# Patient Record
Sex: MALE | Race: WHITE
[De-identification: names, ages, dates, MRNs, and addresses within clinical notes are randomized per-mention and may not be internally consistent; named-entity substitution may affect disease eponyms.]

---

## 2016-11-28 NOTE — HP
DATE OF ADMISSION:   



CHIEF COMPLAINT: Gastroesophageal reflux disease. 



HISTORY OF PRESENT ILLNESS: Patient is a 71-year-old male with a 

history of chronic reflux. He underwent an upper endoscopy in 2013. 

The patient was found to have erosive distal esophagitis and a hiatal 

hernia and gastritis as well. The patient has been on antacid therapy 

since that time.  



Past medical history and past surgical history:  See list. 



ALLERGIES: See list. 



PHYSICAL EXAMINATION: Deferred until the time of procedure. 



IMPRESSION: A 71-year-old male with reflux.



PLAN: Will proceed with upper endoscopy on 11/30. The risks of 

bleeding, infection and bowel perforation will be discussed with the 

patient preoperatively.

## 2017-04-19 ENCOUNTER — HOSPITAL ENCOUNTER (OUTPATIENT)
Dept: HOSPITAL 47 - ORWHC2ENDO | Age: 72
Discharge: HOME | End: 2017-04-19
Payer: MEDICARE

## 2017-04-19 VITALS — RESPIRATION RATE: 20 BRPM | TEMPERATURE: 96.9 F

## 2017-04-19 VITALS — SYSTOLIC BLOOD PRESSURE: 119 MMHG | HEART RATE: 73 BPM | DIASTOLIC BLOOD PRESSURE: 73 MMHG

## 2017-04-19 VITALS — BODY MASS INDEX: 28.8 KG/M2

## 2017-04-19 DIAGNOSIS — Z87.891: ICD-10-CM

## 2017-04-19 DIAGNOSIS — I25.2: ICD-10-CM

## 2017-04-19 DIAGNOSIS — K29.50: Primary | ICD-10-CM

## 2017-04-19 DIAGNOSIS — K21.9: ICD-10-CM

## 2017-04-19 DIAGNOSIS — Z79.899: ICD-10-CM

## 2017-04-19 DIAGNOSIS — K44.9: ICD-10-CM

## 2017-04-19 DIAGNOSIS — I10: ICD-10-CM

## 2017-04-19 DIAGNOSIS — Z95.5: ICD-10-CM

## 2017-04-19 DIAGNOSIS — E78.5: ICD-10-CM

## 2017-04-19 DIAGNOSIS — M19.90: ICD-10-CM

## 2017-04-19 DIAGNOSIS — I25.10: ICD-10-CM

## 2017-04-19 DIAGNOSIS — Z79.82: ICD-10-CM

## 2017-04-19 PROCEDURE — 88342 IMHCHEM/IMCYTCHM 1ST ANTB: CPT

## 2017-04-19 PROCEDURE — 88305 TISSUE EXAM BY PATHOLOGIST: CPT

## 2017-04-19 PROCEDURE — 43239 EGD BIOPSY SINGLE/MULTIPLE: CPT

## 2017-04-19 NOTE — P.GSHP
History of Present Illness


H&P Date: 04/19/17


Chief Complaint: Third





Patient today for upper endoscopy.  He has a chronic history of reflux.  2013 

had an upper endoscopy which revealed distal esophagitis and a hiatal hernia.  

Patient is taking daily omeprazole.  His symptoms are well-controlled on his 

oral medications.  Denies dysphagia.





Past Medical History


Past Medical History: GERD/Reflux, Hyperlipidemia, Hypertension, Myocardial 

Infarction (MI), Osteoarthritis (OA)


Additional Past Medical History / Comment(s): Hiatal Hernia, blood clot in rt 

arm yrs ago, hiatal hernia,


Last Myocardial Infarction Date:: 2005


History of Any Multi-Drug Resistant Organisms: None Reported


Past Surgical History: Heart Catheterization With Stent, Orthopedic Surgery


Additional Past Surgical History / Comment(s): orchiectomy, colonoscopy; EGD, 

lilly hand carpal tunnel, lilly shoulders surgery


Past Anesthesia/Blood Transfusion Reactions: Previous Problems w/ Anesthesia


Additional Past Anesthesia/Blood Transfusion Reaction / Comment(s): .


Date of Last Stent Placement:: 2005


Past Psychological History: No Psychological Hx Reported


Smoking Status: Former smoker


Past Alcohol Use History: Occasional


Additional Past Alcohol Use History / Comment(s): quit smoking 30 yrs ago, 

smoked for 20 yrs


Past Drug Use History: None Reported





- Past Family History


  ** Sister(s)


Family Medical History: Cancer





  ** Daughter(s)


Family Medical History: Cancer





Medications and Allergies


 Home Medications











 Medication  Instructions  Recorded  Confirmed  Type


 


Aspirin 325 mg PO DAILY 11/28/16 04/19/17 History


 


Atenolol 25 mg PO BID 11/28/16 04/19/17 History


 


Atorvastatin [Lipitor] 80 mg PO HS 11/28/16 04/19/17 History


 


Cholecalciferol [Vitamin D3] 1,000 unit PO DAILY 11/28/16 04/19/17 History


 


Enalapril [Vasotec] 2.5 mg PO HS 11/28/16 04/19/17 History


 


Ezetimibe [Zetia] 10 mg PO HS 11/28/16 04/19/17 History


 


Omeprazole 20 mg PO DAILY 11/28/16 04/19/17 History


 


Oxybutynin Chloride [Ditropan XL] 20 mg PO HS 11/28/16 04/19/17 History











 Allergies











Allergy/AdvReac Type Severity Reaction Status Date / Time


 


No Known Allergies Allergy   Verified 04/12/17 09:29














Surgical - Exam


 Vital Signs











Temp Pulse Resp BP Pulse Ox


 


 96.9 F L  84   20   128/74   94 L


 


 04/19/17 08:13  04/19/17 08:13  04/19/17 08:13  04/19/17 08:13  04/19/17 08:13

















Physical exam:


General: Well-developed, well-nourished


HEENT: Normocephalic, sclerae nonicteric


Abdomen: Nontender, nondistended


Extremities: No edema


Neuro: Alert and oriented





Assessment and Plan


(1) GERD (gastroesophageal reflux disease)


Narrative/Plan: 


Will proceed with upper endoscopy at this time.


Status: Acute

## 2017-04-19 NOTE — P.PCN
Date of Procedure: 04/19/17


Procedure(s) Performed: 


Preoperative Dx: GERD


Postoperative Dx: Gastritis with small erosions, moderate hiatal hernia


Procedure: EGD with Bx


Anesthesia: Sedation


Endoscopist: Dr. Montes


Specimens: Antrum


Endoscopic Procedure:   The patient was on the endoscopy table in the left 

decubitus position.  The Olympus gastroscope was inserted into the oropharynx 

and passed under direct visualization to the region of the third portion of the 

duodenum.  From that point the scope was slowly withdrawn inspecting all 

surfaces carefully.  There were no neoplastic inflammatory or polypoid lesions 

throughout the duodenum.  The pylorus was widely patent.  The stomach was 

carefully inspected.  There was gastritis present with a few small prepyloric 

erosions noted.  A biopsy of the antrum took place to rule out H. pylori.  

Retroflexion revealed a moderate hiatal hernia.  The patient's previous 

esophagitis however is resolved and no definite inflammation was seen.  The 

patient was then taken to the recovery room in stable condition per anesthesia 

guidelines.


Recommendations: Wait biopsy results.  Continue antiacid therapy.

## 2017-07-17 ENCOUNTER — HOSPITAL ENCOUNTER (OUTPATIENT)
Dept: HOSPITAL 47 - RADUSWWP | Age: 72
Discharge: HOME | End: 2017-07-17
Payer: MEDICARE

## 2017-07-17 DIAGNOSIS — I65.23: Primary | ICD-10-CM

## 2017-07-17 PROCEDURE — 93880 EXTRACRANIAL BILAT STUDY: CPT

## 2017-07-17 NOTE — US
EXAMINATION TYPE: US carotid duplex BILAT

 

DATE OF EXAM: 7/17/2017

 

COMPARISON: 2011 US

 

CLINICAL HISTORY: I65.23 Occlusion and stenosis of bilateral carotid. Vertigo

 

EXAM MEASUREMENTS: 

 

RIGHT:  Peak Systolic Velocity (PSV) cm/sec

----- Right CCA:  78.9  

----- Right ICA:  110     

----- Right ECA:  118   

ICA/CCA ratio:  1.4    

 

RIGHT:  End Diastole cm/sec

----- Right CCA:  20.5   

----- Right ICA:  28.6      

----- Right ECA:  19.3     

 

LEFT:  Peak Systolic Velocity (PSV) cm/sec

----- Left CCA:  86.7  

----- Left ICA:  113   

----- Left ECA:  191  

ICA/CCA ratio:  1.3  

 

LEFT:  End Diastole cm/sec

----- Left CCA:  23.6  

----- Left ICA:  34.6   

----- Left ECA:  18.6 

 

VERTEBRALS (direction of flow):

Right Vertebral: diminished

Left Vertebral: antegrade

 

1.  Mild AS changes.

2.  Bilateral ICA tortuosity.

3.  No elevated velocities.

4.  No significant stenosis.

5.  Diminished rt vertebral.

 

IMPRESSION:  

1. I DO NOT SEE EVIDENCE OF A HEMODYNAMICALLY SIGNIFICANT STENOSIS IN EITHER INTERNAL CAROTID OR COMM
ON CAROTID ARTERY.

2. ELEVATED FLOW VELOCITY, LEFT ECA.

3. DAMPENED FLOW IN THE RIGHT VERTEBRAL ARTERY MAY REFLECT DOWNSTREAM NARROWING.   

 

 

Criteria for Assigning % of Stenosis / Diameter reduction

(Estimation based on the indirect measurements of the internal carotid artery velocities (ICA PSV).

1.  Normal (no stenosis)=ICA PSV < 125 cm/s: ratio < 2.0: ICA EDV<40 cm/s.

2. Less than 50% stenosis=ICA PSV < 125 cm/s: ratio < 2.0: ICA EDV<40 cm/s.

3.  50 to 69% stenosis=ICA PSV of 125 to 230 cm/s: ration 2.0 ? 4.0: ICA EDV  cm/s.

4.  Greater than 70% stenosis to near occlusion= ICA PSV > 230 cm/s: ratio > 4.0: ICA EDV > 100 cm/s.
 

5.  Near occlusion= ICA PSV velocities may be low or undetectable: variable ratio and ICA EDV.

6.  Total occlusion=unable to detect flow.

## 2017-12-21 ENCOUNTER — HOSPITAL ENCOUNTER (EMERGENCY)
Dept: HOSPITAL 47 - EC | Age: 72
Discharge: HOME | End: 2017-12-21
Payer: MEDICARE

## 2017-12-21 VITALS — TEMPERATURE: 97.6 F | DIASTOLIC BLOOD PRESSURE: 77 MMHG | HEART RATE: 84 BPM | SYSTOLIC BLOOD PRESSURE: 113 MMHG

## 2017-12-21 VITALS — RESPIRATION RATE: 18 BRPM

## 2017-12-21 DIAGNOSIS — E78.5: ICD-10-CM

## 2017-12-21 DIAGNOSIS — Z79.899: ICD-10-CM

## 2017-12-21 DIAGNOSIS — K21.9: ICD-10-CM

## 2017-12-21 DIAGNOSIS — S05.02XA: Primary | ICD-10-CM

## 2017-12-21 DIAGNOSIS — I10: ICD-10-CM

## 2017-12-21 DIAGNOSIS — Z79.82: ICD-10-CM

## 2017-12-21 DIAGNOSIS — I25.2: ICD-10-CM

## 2017-12-21 DIAGNOSIS — Z87.891: ICD-10-CM

## 2017-12-21 DIAGNOSIS — Z95.5: ICD-10-CM

## 2017-12-21 PROCEDURE — 99282 EMERGENCY DEPT VISIT SF MDM: CPT

## 2017-12-21 NOTE — ED
Eye Problem HPI





- General


Chief complaint: Eye Problems


Stated complaint: Eye Problem


Time Seen by Provider: 12/21/17 20:41


Source: patient


Mode of arrival: ambulatory


Limitations: no limitations





- History of Present Illness


Initial comments: 


72-year-old male patient presents to the emergency department today for 

evaluation of left eye irritation and redness.  Patient states that he has been 

having issues with the eye for the last couple of months.  He states that 

however over the last couple of days the issue seem to be getting worse.  He 

states it feels like he has something in the eye.  He denies any known incident 

where something became stuck in the eye.  He states he has tried to flush out, 

has been using Visine on a daily basis for the last couple of months.  He 

states he has clear drainage from the eye all throughout the day.  He states 

when he wakes up there is some mild crusting.  He denies any cough, congestion, 

sore throat, nasal drainage, fever, or chills.  He denies any difficulty with 

vision. Patient denies any recent rash, shortness breath, chest pain, abdominal 

pain, nausea, vomiting, diarrhea, constipation, back pain, numbness, tingling, 

dizziness, weakness, hematuria, dysuria, urinary urgency, urinary frequency, 

headache, or any other complaints.








- Related Data


 Home Medications











 Medication  Instructions  Recorded  Confirmed


 


Aspirin 325 mg PO DAILY 11/28/16 04/19/17


 


Atenolol 25 mg PO BID 11/28/16 04/19/17


 


Atorvastatin [Lipitor] 80 mg PO HS 11/28/16 04/19/17


 


Cholecalciferol [Vitamin D3] 1,000 unit PO DAILY 11/28/16 04/19/17


 


Enalapril [Vasotec] 2.5 mg PO HS 11/28/16 04/19/17


 


Ezetimibe [Zetia] 10 mg PO HS 11/28/16 04/19/17


 


Omeprazole 20 mg PO DAILY 11/28/16 04/19/17


 


Oxybutynin Chloride [Ditropan XL] 20 mg PO HS 11/28/16 04/19/17











 Allergies











Allergy/AdvReac Type Severity Reaction Status Date / Time


 


No Known Allergies Allergy   Verified 12/21/17 20:08














Review of Systems


ROS Statement: 


Those systems with pertinent positive or pertinent negative responses have been 

documented in the HPI.





ROS Other: All systems not noted in ROS Statement are negative.





Past Medical History


Past Medical History: GERD/Reflux, Hyperlipidemia, Hypertension, Myocardial 

Infarction (MI), Osteoarthritis (OA)


Additional Past Medical History / Comment(s): Hiatal Hernia, blood clot in rt 

arm yrs ago, hiatal hernia, vertigo


Last Myocardial Infarction Date:: 2005


History of Any Multi-Drug Resistant Organisms: None Reported


Past Surgical History: Heart Catheterization With Stent, Orthopedic Surgery


Additional Past Surgical History / Comment(s): orchiectomy, colonoscopy; EGD, 

lilly hand carpal tunnel, lilly shoulders surgery


Past Anesthesia/Blood Transfusion Reactions: Previous Problems w/ Anesthesia


Additional Past Anesthesia/Blood Transfusion Reaction / Comment(s): .


Date of Last Stent Placement:: 2005


Past Psychological History: No Psychological Hx Reported


Smoking Status: Former smoker


Past Alcohol Use History: Occasional


Past Drug Use History: None Reported





- Past Family History


  ** Sister(s)


Family Medical History: Cancer





  ** Daughter(s)


Family Medical History: Cancer





General Exam


Limitations: no limitations


General appearance: alert, in no apparent distress, other (Physical well-

developed, well-nourished adult male patient in no acute distress.  Vital signs 

upon presentation are temperature 97.7F, pulse 99, respirations 18, blood 

pressure 142/71, pulse ox 93% on room air.)


Eye exam: Present: normal appearance, PERRL, EOMI, conjunctival injection (Mild 

left conjunctival injection), other (Clear drainage noted from the left eye.  

Floor seen stain with Wood's lamp examination was performed, there was evidence 

of a conjunctival abrasion at 3:00.  No evidence of hyphema, or corneal 

abrasion noted.).  Absent: scleral icterus, periorbital swelling


ENT exam: Present: normal exam, normal oropharynx, mucous membranes moist


Respiratory exam: Present: normal lung sounds bilaterally.  Absent: respiratory 

distress, wheezes, rales, rhonchi, stridor


Cardiovascular Exam: Present: regular rate, normal rhythm, normal heart sounds.

  Absent: systolic murmur, diastolic murmur, rubs, gallop, clicks


Neurological exam: Present: alert, oriented X3, CN II-XII intact


Psychiatric exam: Present: normal affect, normal mood


Skin exam: Present: warm, dry, intact, normal color.  Absent: rash





Course


 Vital Signs











  12/21/17 12/21/17





  20:05 21:39


 


Temperature 97.7 F 97.6 F


 


Pulse Rate 99 84


 


Respiratory 18 18





Rate  


 


Blood Pressure 142/71 113/77


 


O2 Sat by Pulse 93 L 96





Oximetry  














Medical Decision Making





- Medical Decision Making


72-year-old male patient presented to the emergency department today for 

evaluation of left eye irritation discomfort.  Physical examination did reveal 

mild conjunctival injection, with clear drainage.  Woods lamp examination with 

for seen stain was performed and did reveal a conjunctival abrasion at 3:00.  

Patient will be discharged home with tobramycin ophthalmic ointment.  He is 

instructed to take ibuprofen for discomfort.  He is instructed to use a an 

eyedrop with no preservatives like a and ice saline.  He is instructed not to 

use Visine.  He is instructed to follow-up with ophthalmology for recheck in 1-

2 days.  He is instructed to return here immediately for any new, worsening, or 

concerning symptoms.  He verbalizes understanding and agrees with this plan.








Disposition


Clinical Impression: 


 Conjunctival abrasion





Disposition: HOME SELF-CARE


Condition: Good


Instructions:  Corneal Abrasion  (ED)


Additional Instructions: 


You have a conjunctival abrasion.  Instill tobramycin ointment to the lower 

eyelid 4 times daily while awake.  Do not use Visine.  Purchase an over-the-

counter eyedrop that is preservative free, or something like eye saline drops.  

Follow-up with ophthalmology for reevaluation 1-2 days.  Return here 

immediately for any new, worsening, or concerning symptoms.


Referrals: 


Mavis Mustafa MD [Primary Care Provider] - 1-2 days


Dilip Araya MD [STAFF PHYSICIAN] - 1-2 days


Time of Disposition: 21:31

## 2018-01-19 ENCOUNTER — HOSPITAL ENCOUNTER (OUTPATIENT)
Dept: HOSPITAL 47 - RADUSWWP | Age: 73
Discharge: HOME | End: 2018-01-19
Payer: MEDICARE

## 2018-01-19 DIAGNOSIS — R31.21: Primary | ICD-10-CM

## 2018-01-19 PROCEDURE — 76770 US EXAM ABDO BACK WALL COMP: CPT

## 2018-01-19 NOTE — US
EXAMINATION TYPE: US kidneys/renal and bladder

 

DATE OF EXAM: 1/19/2018

 

COMPARISON: NONE

 

CLINICAL HISTORY: Asymptomatic Microscopic Hematuria R31.21.

 

EXAM MEASUREMENTS:

 

Right Kidney:  10.3 x 5.4 x 5.4 cm

Left Kidney: 10.6 x 5.4 x 4.9 cm

 

 

 

 

Right Kidney: No hydronephrosis or masses seen  

Left Kidney: No hydronephrosis or masses seen  

Bladder: wnl

**Bilateral Jets seen: Yes

 

 

Cortical medullary differentiation is maintained. Cortical echogenicity may be mildly increased bilat
erally.

 

 

 

IMPRESSION:

Correlate for possible medical renal disease.

## 2018-05-21 ENCOUNTER — HOSPITAL ENCOUNTER (OUTPATIENT)
Dept: HOSPITAL 47 - LABWHC1 | Age: 73
End: 2018-05-21
Payer: MEDICARE

## 2018-05-21 DIAGNOSIS — R19.7: Primary | ICD-10-CM

## 2018-05-21 PROCEDURE — 87324 CLOSTRIDIUM AG IA: CPT

## 2018-06-20 ENCOUNTER — HOSPITAL ENCOUNTER (INPATIENT)
Dept: HOSPITAL 47 - EC | Age: 73
LOS: 2 days | Discharge: HOME | DRG: 373 | End: 2018-06-22
Payer: MEDICARE

## 2018-06-20 VITALS — BODY MASS INDEX: 28.8 KG/M2

## 2018-06-20 DIAGNOSIS — D72.829: ICD-10-CM

## 2018-06-20 DIAGNOSIS — R07.89: ICD-10-CM

## 2018-06-20 DIAGNOSIS — I25.2: ICD-10-CM

## 2018-06-20 DIAGNOSIS — I25.10: ICD-10-CM

## 2018-06-20 DIAGNOSIS — Z90.79: ICD-10-CM

## 2018-06-20 DIAGNOSIS — I49.3: ICD-10-CM

## 2018-06-20 DIAGNOSIS — K21.9: ICD-10-CM

## 2018-06-20 DIAGNOSIS — Z79.82: ICD-10-CM

## 2018-06-20 DIAGNOSIS — M19.041: ICD-10-CM

## 2018-06-20 DIAGNOSIS — Z79.899: ICD-10-CM

## 2018-06-20 DIAGNOSIS — Z86.19: ICD-10-CM

## 2018-06-20 DIAGNOSIS — M17.0: ICD-10-CM

## 2018-06-20 DIAGNOSIS — N40.1: ICD-10-CM

## 2018-06-20 DIAGNOSIS — Z87.891: ICD-10-CM

## 2018-06-20 DIAGNOSIS — A04.71: Primary | ICD-10-CM

## 2018-06-20 DIAGNOSIS — D64.9: ICD-10-CM

## 2018-06-20 DIAGNOSIS — K44.9: ICD-10-CM

## 2018-06-20 DIAGNOSIS — Z80.9: ICD-10-CM

## 2018-06-20 DIAGNOSIS — R53.81: ICD-10-CM

## 2018-06-20 DIAGNOSIS — I10: ICD-10-CM

## 2018-06-20 DIAGNOSIS — K64.9: ICD-10-CM

## 2018-06-20 DIAGNOSIS — E78.5: ICD-10-CM

## 2018-06-20 DIAGNOSIS — Z95.5: ICD-10-CM

## 2018-06-20 DIAGNOSIS — M19.042: ICD-10-CM

## 2018-06-20 DIAGNOSIS — Z88.5: ICD-10-CM

## 2018-06-20 DIAGNOSIS — Z86.718: ICD-10-CM

## 2018-06-20 LAB
ALBUMIN SERPL-MCNC: 3.8 G/DL (ref 3.5–5)
ALP SERPL-CCNC: 104 U/L (ref 38–126)
ALT SERPL-CCNC: 30 U/L (ref 21–72)
AMYLASE SERPL-CCNC: <30 U/L (ref 30–110)
ANION GAP SERPL CALC-SCNC: 15 MMOL/L
AST SERPL-CCNC: 23 U/L (ref 17–59)
BASOPHILS # BLD AUTO: 0.1 K/UL (ref 0–0.2)
BASOPHILS NFR BLD AUTO: 0 %
BUN SERPL-SCNC: 14 MG/DL (ref 9–20)
CALCIUM SPEC-MCNC: 9 MG/DL (ref 8.4–10.2)
CHLORIDE SERPL-SCNC: 101 MMOL/L (ref 98–107)
CK SERPL-CCNC: 43 U/L (ref 55–170)
CO2 SERPL-SCNC: 23 MMOL/L (ref 22–30)
EOSINOPHIL # BLD AUTO: 0.1 K/UL (ref 0–0.7)
EOSINOPHIL NFR BLD AUTO: 1 %
ERYTHROCYTE [DISTWIDTH] IN BLOOD BY AUTOMATED COUNT: 5.18 M/UL (ref 4.3–5.9)
ERYTHROCYTE [DISTWIDTH] IN BLOOD: 14.3 % (ref 11.5–15.5)
GLUCOSE SERPL-MCNC: 105 MG/DL (ref 74–99)
HCT VFR BLD AUTO: 43.3 % (ref 39–53)
HGB BLD-MCNC: 14.6 GM/DL (ref 13–17.5)
KETONES UR QL STRIP.AUTO: (no result)
LIPASE SERPL-CCNC: 20 U/L (ref 23–300)
LYMPHOCYTES # SPEC AUTO: 0.9 K/UL (ref 1–4.8)
LYMPHOCYTES NFR SPEC AUTO: 6 %
MCH RBC QN AUTO: 28.2 PG (ref 25–35)
MCHC RBC AUTO-ENTMCNC: 33.7 G/DL (ref 31–37)
MCV RBC AUTO: 83.6 FL (ref 80–100)
MONOCYTES # BLD AUTO: 1.9 K/UL (ref 0–1)
MONOCYTES NFR BLD AUTO: 12 %
NEUTROPHILS # BLD AUTO: 13.2 K/UL (ref 1.3–7.7)
NEUTROPHILS NFR BLD AUTO: 80 %
PH UR: 5.5 [PH] (ref 5–8)
PLATELET # BLD AUTO: 243 K/UL (ref 150–450)
POTASSIUM SERPL-SCNC: 4.1 MMOL/L (ref 3.5–5.1)
PROT SERPL-MCNC: 6.4 G/DL (ref 6.3–8.2)
RBC UR QL: 2 /HPF (ref 0–5)
SODIUM SERPL-SCNC: 139 MMOL/L (ref 137–145)
SP GR UR: 1.01 (ref 1–1.03)
SQUAMOUS UR QL AUTO: <1 /HPF (ref 0–4)
TROPONIN I SERPL-MCNC: <0.012 NG/ML (ref 0–0.03)
UROBILINOGEN UR QL STRIP: <2 MG/DL (ref ?–2)
WBC # BLD AUTO: 16.4 K/UL (ref 3.8–10.6)
WBC #/AREA URNS HPF: 1 /HPF (ref 0–5)

## 2018-06-20 PROCEDURE — 80053 COMPREHEN METABOLIC PANEL: CPT

## 2018-06-20 PROCEDURE — 93005 ELECTROCARDIOGRAM TRACING: CPT

## 2018-06-20 PROCEDURE — 83690 ASSAY OF LIPASE: CPT

## 2018-06-20 PROCEDURE — 71046 X-RAY EXAM CHEST 2 VIEWS: CPT

## 2018-06-20 PROCEDURE — 96360 HYDRATION IV INFUSION INIT: CPT

## 2018-06-20 PROCEDURE — 87324 CLOSTRIDIUM AG IA: CPT

## 2018-06-20 PROCEDURE — 82553 CREATINE MB FRACTION: CPT

## 2018-06-20 PROCEDURE — 81001 URINALYSIS AUTO W/SCOPE: CPT

## 2018-06-20 PROCEDURE — 85025 COMPLETE CBC W/AUTO DIFF WBC: CPT

## 2018-06-20 PROCEDURE — 87045 FECES CULTURE AEROBIC BACT: CPT

## 2018-06-20 PROCEDURE — 84484 ASSAY OF TROPONIN QUANT: CPT

## 2018-06-20 PROCEDURE — 74018 RADEX ABDOMEN 1 VIEW: CPT

## 2018-06-20 PROCEDURE — 82150 ASSAY OF AMYLASE: CPT

## 2018-06-20 PROCEDURE — 36415 COLL VENOUS BLD VENIPUNCTURE: CPT

## 2018-06-20 PROCEDURE — 96361 HYDRATE IV INFUSION ADD-ON: CPT

## 2018-06-20 PROCEDURE — 82550 ASSAY OF CK (CPK): CPT

## 2018-06-20 PROCEDURE — 99285 EMERGENCY DEPT VISIT HI MDM: CPT

## 2018-06-20 PROCEDURE — 87046 STOOL CULTR AEROBIC BACT EA: CPT

## 2018-06-20 RX ADMIN — CEFAZOLIN SCH MLS/HR: 330 INJECTION, POWDER, FOR SOLUTION INTRAMUSCULAR; INTRAVENOUS at 15:00

## 2018-06-20 RX ADMIN — FIDAXOMICIN SCH MG: 200 TABLET, FILM COATED ORAL at 23:40

## 2018-06-20 RX ADMIN — EZETIMIBE SCH MG: 10 TABLET ORAL at 22:14

## 2018-06-20 RX ADMIN — ATENOLOL SCH MG: 25 TABLET ORAL at 22:14

## 2018-06-20 RX ADMIN — CHOLESTYRAMINE SCH GM: 4 POWDER, FOR SUSPENSION ORAL at 18:25

## 2018-06-20 RX ADMIN — LISINOPRIL SCH MG: 5 TABLET ORAL at 21:57

## 2018-06-20 RX ADMIN — ATORVASTATIN CALCIUM SCH MG: 80 TABLET, FILM COATED ORAL at 21:57

## 2018-06-20 NOTE — ED
General Adult HPI





- General


Chief complaint: Nausea/Vomiting/Diarrhea


Stated complaint: CHEST PAIN POSS CDIFF


Time Seen by Provider: 06/20/18 10:41


Source: patient, RN notes reviewed, old records reviewed


Mode of arrival: ambulatory


Limitations: no limitations





- History of Present Illness


Initial comments: 





73-year-old male with history of C. diff presents today worsening diarrhea.  

Also reports having some episodes of chest pain complaints today.  He slept 

wrong which is why he believes he is having the chest pain.  He states he feels 

like he maybe pulled a chest wall muscle..  Patient states that he was recently 

diagnosed with of C. diff, completed oral antibiotics for C. diff out 

patiently.  He reports that he finished antibiotics 10 days ago, Patient 

reports that Dr. Mustafa placed him on the powder substance to treat for the C. 

diff that he does not remember what this was.  On Monday started having 

worsening diarrhea.  Similar to his last episode of colitis.  He states he is 

having some severe diarrhea.  No vomiting.  Does report abdominal cramping.  

Patient reports occasional fevers and chills.





- Related Data


 Home Medications











 Medication  Instructions  Recorded  Confirmed


 


Aspirin 325 mg PO DAILY 11/28/16 06/20/18


 


Atenolol 25 mg PO BID 11/28/16 06/20/18


 


Atorvastatin [Lipitor] 80 mg PO HS 11/28/16 06/20/18


 


Cholecalciferol [Vitamin D3] 1,000 unit PO DAILY 11/28/16 06/20/18


 


Enalapril [Vasotec] 2.5 mg PO HS 11/28/16 06/20/18


 


Ezetimibe [Zetia] 10 mg PO HS 11/28/16 06/20/18


 


Omeprazole 20 mg PO DAILY 11/28/16 06/20/18


 


Tolterodine Tartrate [Detrol LA] 4 mg PO HS 06/20/18 06/20/18











 Allergies











Allergy/AdvReac Type Severity Reaction Status Date / Time


 


meperidine [From Demerol] Allergy  UNRESPONSIV Verified 06/20/18 12:56





   E  














Review of Systems


ROS Statement: 


Those systems with pertinent positive or pertinent negative responses have been 

documented in the HPI.





ROS Other: All systems not noted in ROS Statement are negative.





Past Medical History


Past Medical History: GERD/Reflux, Hyperlipidemia, Hypertension, Myocardial 

Infarction (MI), Osteoarthritis (OA)


Additional Past Medical History / Comment(s): Hiatal Hernia, blood clot in rt 

arm yrs ago, hiatal hernia, vertigo


Last Myocardial Infarction Date:: 2005


History of Any Multi-Drug Resistant Organisms: None Reported


Past Surgical History: Heart Catheterization With Stent, Orthopedic Surgery


Additional Past Surgical History / Comment(s): orchiectomy, colonoscopy; EGD, 

lilly hand carpal tunnel, lilly shoulders surgery


Past Anesthesia/Blood Transfusion Reactions: Previous Problems w/ Anesthesia


Additional Past Anesthesia/Blood Transfusion Reaction / Comment(s): .


Date of Last Stent Placement:: 2005


Past Psychological History: No Psychological Hx Reported


Smoking Status: Former smoker


Past Alcohol Use History: Occasional


Past Drug Use History: None Reported





- Past Family History


  ** Sister(s)


Family Medical History: Cancer





  ** Daughter(s)


Family Medical History: Cancer





General Exam





- General Exam Comments


Initial Comments: 





73-year-old male.  Alert and oriented.  No significant distress. 


Limitations: no limitations


General appearance: alert, in no apparent distress


Head exam: Present: atraumatic, normocephalic, normal inspection


Eye exam: Present: normal appearance, PERRL, EOMI.  Absent: scleral icterus, 

conjunctival injection, periorbital swelling


ENT exam: Present: normal exam, mucous membranes moist


Neck exam: Present: normal inspection


Respiratory exam: Present: normal lung sounds bilaterally.  Absent: respiratory 

distress, wheezes, rales, rhonchi, stridor


Cardiovascular Exam: Present: regular rate, normal rhythm, normal heart sounds.

  Absent: systolic murmur, diastolic murmur, rubs, gallop, clicks


GI/Abdominal exam: Present: soft, normal bowel sounds, other (Hyperactive bowel 

sounds.  No tenderness or guarding.  ).  Absent: distended, tenderness, guarding

, rebound, rigid


Extremities exam: Present: normal inspection


Back exam: Present: normal inspection


Neurological exam: Present: alert, oriented X3, CN II-XII intact


Psychiatric exam: Present: normal affect


Skin exam: Present: warm, dry, intact, normal color.  Absent: rash





Course


 Vital Signs











  06/20/18 06/20/18 06/20/18





  10:27 12:16 14:04


 


Temperature 97.9 F  


 


Pulse Rate 100 97 95


 


Respiratory 18 18 18





Rate   


 


Blood Pressure 119/80 157/92 133/74


 


O2 Sat by Pulse 96 95 96





Oximetry   














EKG Findings





- EKG Comments:


EKG Findings:: EKG shows a resection prolonged QT.  Abnormal EKG.  294.  QRS 

duration 90 ms.  NJ interval 164 ms.  /467.  No evidence of ST elevation 

or T-wave inversion.





Medical Decision Making





- Medical Decision Making





73-year-old male presents emergency room today to complain of chest pain onset 

of this morning as well as worsening diarrhea for the past few days.  He is 

recently diagnosed with C. diff and treated outpatient.  He states that his 

diarrhea returned on Monday and has been worse.  Patient was given IV fluids 

and her team.  Evidence of leukocytosis likely responses diarrhea.  He is C. 

diff positive.  EKG read shows no significant changes at this time.  Troponin 

is negative.  At this time limitation for chest pain and treatment for C. diff.

  We will give the Patient one by mouth Flagyl at this time.





- Lab Data


Result diagrams: 


 06/20/18 11:00





 06/20/18 11:00


 Lab Results











  06/20/18 06/20/18 06/20/18 Range/Units





  11:00 11:00 11:00 


 


WBC   16.4 H   (3.8-10.6)  k/uL


 


RBC   5.18   (4.30-5.90)  m/uL


 


Hgb   14.6   (13.0-17.5)  gm/dL


 


Hct   43.3   (39.0-53.0)  %


 


MCV   83.6   (80.0-100.0)  fL


 


MCH   28.2   (25.0-35.0)  pg


 


MCHC   33.7   (31.0-37.0)  g/dL


 


RDW   14.3   (11.5-15.5)  %


 


Plt Count   243   (150-450)  k/uL


 


Neutrophils %   80   %


 


Lymphocytes %   6   %


 


Monocytes %   12   %


 


Eosinophils %   1   %


 


Basophils %   0   %


 


Neutrophils #   13.2 H   (1.3-7.7)  k/uL


 


Lymphocytes #   0.9 L   (1.0-4.8)  k/uL


 


Monocytes #   1.9 H   (0-1.0)  k/uL


 


Eosinophils #   0.1   (0-0.7)  k/uL


 


Basophils #   0.1   (0-0.2)  k/uL


 


Sodium  139    (137-145)  mmol/L


 


Potassium  4.1    (3.5-5.1)  mmol/L


 


Chloride  101    ()  mmol/L


 


Carbon Dioxide  23    (22-30)  mmol/L


 


Anion Gap  15    mmol/L


 


BUN  14    (9-20)  mg/dL


 


Creatinine  0.90    (0.66-1.25)  mg/dL


 


Est GFR (CKD-EPI)AfAm  >90    (>60 ml/min/1.73 sqM)  


 


Est GFR (CKD-EPI)NonAf  86    (>60 ml/min/1.73 sqM)  


 


Glucose  105 H    (74-99)  mg/dL


 


Calcium  9.0    (8.4-10.2)  mg/dL


 


Total Bilirubin  1.1    (0.2-1.3)  mg/dL


 


AST  23    (17-59)  U/L


 


ALT  30    (21-72)  U/L


 


Alkaline Phosphatase  104    ()  U/L


 


Troponin I    <0.012  (0.000-0.034)  ng/mL


 


Total Protein  6.4    (6.3-8.2)  g/dL


 


Albumin  3.8    (3.5-5.0)  g/dL


 


Amylase  <30 L    ()  U/L


 


Lipase  20 L    ()  U/L


 


Urine Color     


 


Urine Appearance     (Clear)  


 


Urine pH     (5.0-8.0)  


 


Ur Specific Gravity     (1.001-1.035)  


 


Urine Protein     (Negative)  


 


Urine Glucose (UA)     (Negative)  


 


Urine Ketones     (Negative)  


 


Urine Blood     (Negative)  


 


Urine Nitrite     (Negative)  


 


Urine Bilirubin     (Negative)  


 


Urine Urobilinogen     (<2.0)  mg/dL


 


Ur Leukocyte Esterase     (Negative)  


 


Urine RBC     (0-5)  /hpf


 


Urine WBC     (0-5)  /hpf


 


Ur Squamous Epith Cells     (0-4)  /hpf


 


Urine Bacteria     (None)  /hpf


 


Urine Mucus     (None)  /hpf


 


C. difficile (EIA) Intrp     (Negative)  














  06/20/18 06/20/18 Range/Units





  12:24 12:30 


 


WBC    (3.8-10.6)  k/uL


 


RBC    (4.30-5.90)  m/uL


 


Hgb    (13.0-17.5)  gm/dL


 


Hct    (39.0-53.0)  %


 


MCV    (80.0-100.0)  fL


 


MCH    (25.0-35.0)  pg


 


MCHC    (31.0-37.0)  g/dL


 


RDW    (11.5-15.5)  %


 


Plt Count    (150-450)  k/uL


 


Neutrophils %    %


 


Lymphocytes %    %


 


Monocytes %    %


 


Eosinophils %    %


 


Basophils %    %


 


Neutrophils #    (1.3-7.7)  k/uL


 


Lymphocytes #    (1.0-4.8)  k/uL


 


Monocytes #    (0-1.0)  k/uL


 


Eosinophils #    (0-0.7)  k/uL


 


Basophils #    (0-0.2)  k/uL


 


Sodium    (137-145)  mmol/L


 


Potassium    (3.5-5.1)  mmol/L


 


Chloride    ()  mmol/L


 


Carbon Dioxide    (22-30)  mmol/L


 


Anion Gap    mmol/L


 


BUN    (9-20)  mg/dL


 


Creatinine    (0.66-1.25)  mg/dL


 


Est GFR (CKD-EPI)AfAm    (>60 ml/min/1.73 sqM)  


 


Est GFR (CKD-EPI)NonAf    (>60 ml/min/1.73 sqM)  


 


Glucose    (74-99)  mg/dL


 


Calcium    (8.4-10.2)  mg/dL


 


Total Bilirubin    (0.2-1.3)  mg/dL


 


AST    (17-59)  U/L


 


ALT    (21-72)  U/L


 


Alkaline Phosphatase    ()  U/L


 


Troponin I    (0.000-0.034)  ng/mL


 


Total Protein    (6.3-8.2)  g/dL


 


Albumin    (3.5-5.0)  g/dL


 


Amylase    ()  U/L


 


Lipase    ()  U/L


 


Urine Color   Yellow  


 


Urine Appearance   Clear  (Clear)  


 


Urine pH   5.5  (5.0-8.0)  


 


Ur Specific Gravity   1.013  (1.001-1.035)  


 


Urine Protein   Trace H  (Negative)  


 


Urine Glucose (UA)   Negative  (Negative)  


 


Urine Ketones   2+ H  (Negative)  


 


Urine Blood   Small H  (Negative)  


 


Urine Nitrite   Negative  (Negative)  


 


Urine Bilirubin   Negative  (Negative)  


 


Urine Urobilinogen   <2.0  (<2.0)  mg/dL


 


Ur Leukocyte Esterase   Negative  (Negative)  


 


Urine RBC   2  (0-5)  /hpf


 


Urine WBC   1  (0-5)  /hpf


 


Ur Squamous Epith Cells   <1  (0-4)  /hpf


 


Urine Bacteria   Rare H  (None)  /hpf


 


Urine Mucus   Occasional H  (None)  /hpf


 


C. difficile (EIA) Intrp  Positive A   (Negative)  














- Radiology Data


Radiology results: report reviewed


Chest x-ray shows left basilar subsequent mental consolidation correlate for 

Praveen versus infiltrate.  X-ray shows nonspecific abdomen.  Differential 

included ileus or enteritis correlate clinically.





Disposition


Clinical Impression: 


 Chest pain, C. difficile colitis





Disposition: ADMITTED AS IP TO THIS HOSP


Condition: Stable


Is patient prescribed a controlled substance at d/c from ED?: No


When asked, does pt state using other controlled substances?: No


If prescribed controlled substance>3 days was MAPS reviewed?: No


If opioid is for acute pain is fill amount 7 days or less?: No


If Rx opioid, was Start Talking consent form obtained?: No


Referrals: 


Mavis Mustafa MD [Primary Care Provider] - 1-2 days


Time of Disposition: 14:19

## 2018-06-20 NOTE — XR
EXAMINATION TYPE: XR chest 2V

 

DATE OF EXAM: 6/20/2018

 

COMPARISON: 12/23/2013

 

TECHNIQUE: PA and lateral views submitted.

 

HISTORY: Chest pain

 

FINDINGS:

No pneumothorax or pleural Atherosclerotic change aorta. There is subsegmental changes at the left roosevelt
ng base. Hypertrophic and degenerative change of the spine. No overt failure. Biapical pleural thicke
ron noted. Chronic arthropathy of the shoulders.

 

IMPRESSION: 

1. Left basilar subsegmental consolidation correlate for atelectasis versus infiltrate..

## 2018-06-20 NOTE — XR
EXAMINATION TYPE: XR KUB

 

DATE OF EXAM: 6/20/2018

 

COMPARISON: NONE

 

HISTORY: Pain

 

TECHNIQUE: One view abdominal series

 

FINDINGS:  

The osseous structures are intact.  The bowel gas pattern is nonspecific. There is a paucity of bowel
 gas. Arthropathy of the hips. Hypertrophic changes of the spine.

 

IMPRESSION:  

1.  Nonspecific abdomen. Differential include an ileus or enteritis correlate clinically.

## 2018-06-20 NOTE — P.HPIM
History of Present Illness


H&P Date: 06/20/18


Chief Complaint: chest pain, abdominal pain, C. diff colitis, CAD, hypertension





73-year-old  male one of Dr. Mustafa's patient with past medical history 

of CAD post MI post angioplasty and stent placement, history of hypertension, 

hyperlipidemia and osteoarthritis who apparently spend the winter in [place] 

developed to have thrombosed hemorrhoid require oral antibiotic for.  This time 

also had the influenza was treated with Tamiflu when according to him had 

antibiotics will more time.  Patient was treated with his ophthalmologist 

recently with the 10 days worth of amoxicillin which finally gave him severe 

episode of smelly diarrhea was diagnosed with C. diff as an outpatient and 

treated for 14 days with Flagyl and Questran.  Patient done well ~last 5 days 

when he developed to have worsening watery diarrhea going over 5-15 times a day 

with worsening symptom consistent with abdominal pain severe dehydration 

lightheadedness.  Patient developed to have beside his increase abdominal 

discomfort and diarrhea and episodes chest pain today.  Ended up coming to the 

emergency department at [facility] where was seen and evaluated his 

troponin and EKG didn't show any abnormality, chest x-ray showed atelectasis 

with no sign of infiltrate.  KUB showed nonspecific abdomen with differential 

include an ileus or enteritis.  Lab value shows positive C. diff also 

significantly elevated WBC.  Patient was started on Flagyl hydration Will add 

vancomycin suspension and admitted to the hospital with above problem.





Review of Systems





Constitutional: Reports fatigue, Reports lethargy, Reports weakness


Eyes: denies blurred vision, denies bulging eye, denies decreased vision


Ears: deny: decreased hearing, ear discharge, earache


Ears, nose, mouth and throat: Denies headache, Denies sore throat


Cardiovascular: Reports decreased exercise tolerance, Reports dyspnea on 

exertion, Reports shortness of breath, worsening edema.


Respiratory: Reports cough, Reports dyspnea, worsening shortness of breath 

along with cough productive phlegm.


Gastrointestinal: Positive abdominal pain, positive diarrhea, positive nausea, 

positive vomiting


Genitourinary: Reports as per HPI


Musculoskeletal: Denies myalgias


Musculoskeletal: absent: ankle pain, ankle stiffness, ankle swelling


Integumentary: Denies pruritus, Denies rash, worsening edema of the lower 

extremity.


Neurological: Reports change in mentation, Reports weakness, mild change mental 

status compared to his baseline.


Psychiatric: Denies anxiety, Denies depression


Endocrine: Denies fatigue, Denies weight change


Hematologic/Lymphatic: Reports as per HPI


Allergic/Immunologic: Reports as per HPI








Past Medical History


Past Medical History: GERD/Reflux, Hyperlipidemia, Hypertension, Myocardial 

Infarction (MI), Osteoarthritis (OA)


Additional Past Medical History / Comment(s): Hiatal Hernia, blood clot in rt 

arm yrs ago, hiatal hernia, vertigo


Last Myocardial Infarction Date:: 2005


History of Any Multi-Drug Resistant Organisms: None Reported


Past Surgical History: Heart Catheterization With Stent, Orthopedic Surgery


Additional Past Surgical History / Comment(s): orchiectomy, colonoscopy; EGD, 

lilly hand carpal tunnel, lilly shoulders surgery


Past Anesthesia/Blood Transfusion Reactions: Previous Problems w/ Anesthesia


Additional Past Anesthesia/Blood Transfusion Reaction / Comment(s): .


Date of Last Stent Placement:: 2005


Past Psychological History: No Psychological Hx Reported


Smoking Status: Former smoker


Past Alcohol Use History: Occasional


Past Drug Use History: None Reported





- Past Family History


  ** Sister(s)


Family Medical History: Cancer





  ** Daughter(s)


Family Medical History: Cancer





Medications and Allergies


 Home Medications











 Medication  Instructions  Recorded  Confirmed  Type


 


Aspirin 325 mg PO DAILY 11/28/16 06/20/18 History


 


Atenolol 25 mg PO BID 11/28/16 06/20/18 History


 


Atorvastatin [Lipitor] 80 mg PO HS 11/28/16 06/20/18 History


 


Cholecalciferol [Vitamin D3] 1,000 unit PO DAILY 11/28/16 06/20/18 History


 


Enalapril [Vasotec] 2.5 mg PO HS 11/28/16 06/20/18 History


 


Ezetimibe [Zetia] 10 mg PO HS 11/28/16 06/20/18 History


 


Omeprazole 20 mg PO DAILY 11/28/16 06/20/18 History


 


Tolterodine Tartrate [Detrol LA] 4 mg PO HS 06/20/18 06/20/18 History











 Allergies











Allergy/AdvReac Type Severity Reaction Status Date / Time


 


meperidine [From Demerol] Allergy  UNRESPONSIV Verified 06/20/18 12:56





   E  














Physical Exam


Vitals: 


 Vital Signs











  Temp Pulse Resp BP Pulse Ox


 


 06/20/18 15:00   102 H  18  133/84  95


 


 06/20/18 14:04   95  18  133/74  96


 


 06/20/18 12:16   97  18  157/92  95


 


 06/20/18 10:27  97.9 F  100  18  119/80  96








 Intake and Output











 06/20/18 06/20/18 06/20/18





 06:59 14:59 22:59


 


Other:   


 


  Weight  86.183 kg 














Constitutional: Well-developed in no acute respiratory distress.  With mild 

fatigue tiredness and lightheadedness.


Neck HEENT: Supple pupils are symmetric and reactive to light no carotid bruits 

or thyroid enlargement.


Chest wall: Will expansion bilaterally with no chest wall deformity.


Lungs: Decreased breath sound, no crackles or rhonchi no wheezes.


Cardiovascular: PMI is in the left fifth costal space, anterior axillary line, 

irregular rhythm and rate S1, S2, positive S3, positive PVCs.


Abdomen: distended, slight discomfort in the mid abdominal and lower abdominal 

area with worsening gas effect with no rebound or rigidity..


Extremities: No edema, decreased pulses dorsalis pedis and posterior tibial 

bilaterally.  Positive severe degenerative arthritis in both knees with mild to 

moderate osteoarthritis in both hands.


Neuro: Alert, slightly confused, moving all his 4 extremity has generalized 

weakness no focal deficit.








Results


CBC & Chem 7: 


 06/20/18 11:00





 06/20/18 11:00


Labs: 


 Abnormal Lab Results - Last 24 Hours (Table)











  06/20/18 06/20/18 06/20/18 Range/Units





  11:00 11:00 12:24 


 


WBC   16.4 H   (3.8-10.6)  k/uL


 


Neutrophils #   13.2 H   (1.3-7.7)  k/uL


 


Lymphocytes #   0.9 L   (1.0-4.8)  k/uL


 


Monocytes #   1.9 H   (0-1.0)  k/uL


 


Glucose  105 H    (74-99)  mg/dL


 


Amylase  <30 L    ()  U/L


 


Lipase  20 L    ()  U/L


 


Urine Protein     (Negative)  


 


Urine Ketones     (Negative)  


 


Urine Blood     (Negative)  


 


Urine Bacteria     (None)  /hpf


 


Urine Mucus     (None)  /hpf


 


C. difficile (EIA) Intrp    Positive A  (Negative)  














  06/20/18 Range/Units





  12:30 


 


WBC   (3.8-10.6)  k/uL


 


Neutrophils #   (1.3-7.7)  k/uL


 


Lymphocytes #   (1.0-4.8)  k/uL


 


Monocytes #   (0-1.0)  k/uL


 


Glucose   (74-99)  mg/dL


 


Amylase   ()  U/L


 


Lipase   ()  U/L


 


Urine Protein  Trace H  (Negative)  


 


Urine Ketones  2+ H  (Negative)  


 


Urine Blood  Small H  (Negative)  


 


Urine Bacteria  Rare H  (None)  /hpf


 


Urine Mucus  Occasional H  (None)  /hpf


 


C. difficile (EIA) Intrp   (Negative)  














Assessment and Plan


Plan: 





1 severe abdominal pain: Most likely C. diff colitis with worsening symptom and 

recurrent will continue Flagyl will add vancomycin suspension along with 

Questran and probiotics we'll consult infectious disease with Dr. garcia to see 

patient on regular basis and see if he can benefit from dificid.





2 leukocytosis: Awaiting for culture no need for any other antibiotic at this 

point.





3 CAD: With recurrent chest pain continue with troponin 3 this time repeat EKG 

and if needed will consult cardiology.





4 hypertension: Patient has been doing well on atenolol 25 mg twice a day and 

Vasotec 2.5 g a day.





5 hyperlipidemia: Has been on atorvastatin 80 mg daily.





6 BPH with worsening symptoms continue patient on Detrol LA 4 mg daily at 

bedtime.





7 GERD/GI prophylaxis: Patient was switched to pantoprazole IV.





8 DVT prophylaxis: Early mobilization and knee-high ZHANG hose.





CODE STATUS: Full code.





Admit patient to inpatient status for more than 2 nights.

## 2018-06-21 VITALS — RESPIRATION RATE: 18 BRPM

## 2018-06-21 LAB
ALBUMIN SERPL-MCNC: 2.8 G/DL (ref 3.5–5)
ALP SERPL-CCNC: 78 U/L (ref 38–126)
ALT SERPL-CCNC: 30 U/L (ref 21–72)
ANION GAP SERPL CALC-SCNC: 9 MMOL/L
AST SERPL-CCNC: 16 U/L (ref 17–59)
BASOPHILS # BLD AUTO: 0.1 K/UL (ref 0–0.2)
BASOPHILS NFR BLD AUTO: 1 %
BUN SERPL-SCNC: 11 MG/DL (ref 9–20)
CALCIUM SPEC-MCNC: 8.5 MG/DL (ref 8.4–10.2)
CHLORIDE SERPL-SCNC: 105 MMOL/L (ref 98–107)
CK SERPL-CCNC: 40 U/L (ref 55–170)
CO2 SERPL-SCNC: 25 MMOL/L (ref 22–30)
EOSINOPHIL # BLD AUTO: 0.2 K/UL (ref 0–0.7)
EOSINOPHIL NFR BLD AUTO: 2 %
ERYTHROCYTE [DISTWIDTH] IN BLOOD BY AUTOMATED COUNT: 4.56 M/UL (ref 4.3–5.9)
ERYTHROCYTE [DISTWIDTH] IN BLOOD: 14.3 % (ref 11.5–15.5)
GLUCOSE SERPL-MCNC: 99 MG/DL (ref 74–99)
HCT VFR BLD AUTO: 38.3 % (ref 39–53)
HGB BLD-MCNC: 12.8 GM/DL (ref 13–17.5)
LYMPHOCYTES # SPEC AUTO: 1.9 K/UL (ref 1–4.8)
LYMPHOCYTES NFR SPEC AUTO: 16 %
MCH RBC QN AUTO: 28.1 PG (ref 25–35)
MCHC RBC AUTO-ENTMCNC: 33.5 G/DL (ref 31–37)
MCV RBC AUTO: 84.1 FL (ref 80–100)
MONOCYTES # BLD AUTO: 1.4 K/UL (ref 0–1)
MONOCYTES NFR BLD AUTO: 12 %
NEUTROPHILS # BLD AUTO: 7.7 K/UL (ref 1.3–7.7)
NEUTROPHILS NFR BLD AUTO: 66 %
PLATELET # BLD AUTO: 216 K/UL (ref 150–450)
POTASSIUM SERPL-SCNC: 3.6 MMOL/L (ref 3.5–5.1)
PROT SERPL-MCNC: 5.2 G/DL (ref 6.3–8.2)
SODIUM SERPL-SCNC: 139 MMOL/L (ref 137–145)
TROPONIN I SERPL-MCNC: <0.012 NG/ML (ref 0–0.03)
WBC # BLD AUTO: 11.8 K/UL (ref 3.8–10.6)

## 2018-06-21 RX ADMIN — FIDAXOMICIN SCH MG: 200 TABLET, FILM COATED ORAL at 09:11

## 2018-06-21 RX ADMIN — CEFAZOLIN SCH MLS/HR: 330 INJECTION, POWDER, FOR SOLUTION INTRAMUSCULAR; INTRAVENOUS at 09:11

## 2018-06-21 RX ADMIN — CEFAZOLIN SCH MLS/HR: 330 INJECTION, POWDER, FOR SOLUTION INTRAMUSCULAR; INTRAVENOUS at 17:45

## 2018-06-21 RX ADMIN — FIDAXOMICIN SCH MG: 200 TABLET, FILM COATED ORAL at 20:24

## 2018-06-21 RX ADMIN — PANTOPRAZOLE SODIUM SCH MG: 40 INJECTION, POWDER, FOR SOLUTION INTRAVENOUS at 09:11

## 2018-06-21 RX ADMIN — CEFAZOLIN SCH MLS/HR: 330 INJECTION, POWDER, FOR SOLUTION INTRAMUSCULAR; INTRAVENOUS at 23:32

## 2018-06-21 RX ADMIN — Medication SCH UNIT: at 09:11

## 2018-06-21 RX ADMIN — ATORVASTATIN CALCIUM SCH MG: 80 TABLET, FILM COATED ORAL at 20:23

## 2018-06-21 RX ADMIN — ATENOLOL SCH MG: 25 TABLET ORAL at 09:11

## 2018-06-21 RX ADMIN — ASPIRIN 325 MG ORAL TABLET SCH MG: 325 PILL ORAL at 09:11

## 2018-06-21 RX ADMIN — CHOLESTYRAMINE SCH GM: 4 POWDER, FOR SUSPENSION ORAL at 09:12

## 2018-06-21 RX ADMIN — ATENOLOL SCH MG: 25 TABLET ORAL at 20:24

## 2018-06-21 RX ADMIN — CHOLESTYRAMINE SCH GM: 4 POWDER, FOR SUSPENSION ORAL at 17:45

## 2018-06-21 RX ADMIN — EZETIMIBE SCH MG: 10 TABLET ORAL at 20:23

## 2018-06-21 RX ADMIN — LISINOPRIL SCH MG: 5 TABLET ORAL at 20:23

## 2018-06-21 RX ADMIN — CEFAZOLIN SCH MLS/HR: 330 INJECTION, POWDER, FOR SOLUTION INTRAMUSCULAR; INTRAVENOUS at 17:44

## 2018-06-21 NOTE — CONS
CONSULTATION



Mr. Triana is a 73-year-old male patient who follows with Dr. Tamez.  He was admitted

with diarrhea and is being worked up for diarrhea and possibly C difficile.  He has had

at least 5 to 15 episodes of diarrhea per day. Cardiology was consulted on account of

localized discomfort in the left pectoral area just below his breast.  The patient

points to this with a finger.  He is comfortable.  He is lying flat in bed.  He has no

breathing trouble.  He has no exertional chest pain.



Past history of coronary artery disease, status post MI and stent placement in the

past, hypertension, dyslipidemia and hemorrhoid problems.



REVIEW OF SYSTEMS:

Patient complained of fatigue and weakness. No blurring of vision.  He reports exercise

intolerance, dyspnea on exertion and occasional edema. He reports cough, shortness of

breath and productive cough.  He has abdominal pain, diarrhea, nausea, vomiting.  No

hematuria or dysuria.  No strokes or seizures. No skin lesions.  No musculoskeletal

complaints.



PAST HISTORY:

Past history of GERD, hypertension, dyslipidemia, MI, CAD.



PAST PROCEDURE:

Coronary stenting and orthopedic surgery.



SOCIAL HISTORY:

Former smoker.



MEDICATION LIST:

Aspirin, atenolol, atorvastatin, vitamin D, Vasotec, Zetia, omeprazole, and Detrol.



ALLERGIES:

Allergies to DEMEROL.



PHYSICAL EXAMINATION:

On examination, his pulse rate is about 97 beats per minute.  Blood pressure 133/84

mmHg.  He is afebrile.  Highest temperature was 100 degrees Fahrenheit.  Weight is 86

kilos.

Heart sounds S1, S2 are normal.

Breath sounds are clear.

Extremities are warm.  No edema.

Abdomen is soft.



IMPRESSION:

1. The patient admitted with watery diarrhea, 5 to 15 episodes per day, and being

    evaluated and managed for this by the medical team.

2. Cardiology consulted for known coronary artery disease, status post coronary

    stenting.

3. Atypical chest discomfort with normal cardiac enzymes.  Labs are reviewed.

4. History of hypertension.  Blood pressure is reasonably well controlled.

5. Dyslipidemia, on atorvastatin.



SUGGEST:

Continue cardiac medications and continue management of abdominal issues. From a

cardiac standpoint, there is no further cardiac workup.  He recently saw Dr. Tamez and

will see him as an outpatient as scheduled.  We will sign off.  The patient may go to a

medical floor.





MMODL / IJN: [id number] / Job#: [id number]

## 2018-06-21 NOTE — P.PN
Subjective


73-year-old  male one of Dr. Mustafa's patient with past medical history 

of CAD post MI post angioplasty and stent placement, history of hypertension, 

hyperlipidemia and osteoarthritis who apparently spend the winter in [place] 

developed to have thrombosed hemorrhoid require oral antibiotic for.  This time 

also had the influenza was treated with Tamiflu when according to him had 

antibiotics will more time.  Patient was treated with his ophthalmologist 

recently with the 10 days worth of amoxicillin which finally gave him severe 

episode of smelly diarrhea was diagnosed with C. diff as an outpatient and 

treated for 14 days with Flagyl and Questran.  Patient done well ~last 5 days 

when he developed to have worsening watery diarrhea going over 5-15 times a day 

with worsening symptom consistent with abdominal pain severe dehydration 

lightheadedness.  Patient developed to have beside his increase abdominal 

discomfort and diarrhea and episodes chest pain today.  Ended up coming to the 

emergency department at [facility] where was seen and evaluated his 

troponin and EKG didn't show any abnormality, chest x-ray showed atelectasis 

with no sign of infiltrate.  KUB showed nonspecific abdomen with differential 

include an ileus or enteritis.  Lab value shows positive C. diff also 

significantly elevated WBC.  Patient was started on Flagyl hydration Will add 

vancomycin suspension and admitted to the hospital with above problem.





6/21: Stool culture still in process, C.Diff was positive, he continues on 

Dificid.  He reports he is still having multiple bouts of loose stools.  White 

count slightly elevated at 11.8, vital signs are stable, infectious disease on 

consult.  He denies any further episodes of chest pain, troponins 3 have been 

negative. Cardiology was consulted, recommend medical management and cleared 

from a cardiology standpoint. 





Objective





- Vital Signs


Vital signs: 


 Vital Signs











Temp  96.7 F L  06/21/18 04:00


 


Pulse  81   06/21/18 04:00


 


Resp  16   06/21/18 04:00


 


BP  128/78   06/21/18 04:00


 


Pulse Ox  96   06/21/18 04:00








 Intake & Output











 06/20/18 06/21/18 06/21/18





 18:59 06:59 18:59


 


Weight 86.183 kg 87.1 kg 


 


Other:   


 


  Voiding Method  Toilet 


 


  # Bowel Movements  3 














- Exam


Constitutional: Well-developed in no acute respiratory distress.  With mild 

fatigue tiredness and lightheadedness.


Neck HEENT: Supple pupils are symmetric and reactive to light no carotid bruits 

or thyroid enlargement.


Chest wall: Will expansion bilaterally with no chest wall deformity.


Lungs: Decreased breath sound, no crackles or rhonchi no wheezes.


Cardiovascular: PMI is in the left fifth costal space, anterior axillary line, 

irregular rhythm and rate S1, S2, positive S3, positive PVCs.


Abdomen: distended, slight discomfort in the mid abdominal and lower abdominal 

area with worsening gas effect with no rebound or rigidity..


Extremities: No edema, decreased pulses dorsalis pedis and posterior tibial 

bilaterally.  Positive severe degenerative arthritis in both knees with mild to 

moderate osteoarthritis in both hands.


Neuro: Alert, slightly confused, moving all his 4 extremity has generalized 

weakness no focal deficit.














- Labs


CBC & Chem 7: 


 06/21/18 06:10





 06/21/18 06:10


Labs: 


 Abnormal Lab Results - Last 24 Hours (Table)











  06/20/18 06/20/18 06/20/18 Range/Units





  11:00 11:00 12:24 


 


WBC   16.4 H   (3.8-10.6)  k/uL


 


Hgb     (13.0-17.5)  gm/dL


 


Hct     (39.0-53.0)  %


 


Neutrophils #   13.2 H   (1.3-7.7)  k/uL


 


Lymphocytes #   0.9 L   (1.0-4.8)  k/uL


 


Monocytes #   1.9 H   (0-1.0)  k/uL


 


Glucose  105 H    (74-99)  mg/dL


 


AST     (17-59)  U/L


 


Total Creatine Kinase     ()  U/L


 


Total Protein     (6.3-8.2)  g/dL


 


Albumin     (3.5-5.0)  g/dL


 


Amylase  <30 L    ()  U/L


 


Lipase  20 L    ()  U/L


 


Urine Protein     (Negative)  


 


Urine Ketones     (Negative)  


 


Urine Blood     (Negative)  


 


Urine Bacteria     (None)  /hpf


 


Urine Mucus     (None)  /hpf


 


C. difficile (EIA) Intrp    Positive A  (Negative)  














  06/20/18 06/20/18 06/20/18 Range/Units





  12:30 17:06 23:24 


 


WBC     (3.8-10.6)  k/uL


 


Hgb     (13.0-17.5)  gm/dL


 


Hct     (39.0-53.0)  %


 


Neutrophils #     (1.3-7.7)  k/uL


 


Lymphocytes #     (1.0-4.8)  k/uL


 


Monocytes #     (0-1.0)  k/uL


 


Glucose     (74-99)  mg/dL


 


AST     (17-59)  U/L


 


Total Creatine Kinase   43 L  40 L  ()  U/L


 


Total Protein     (6.3-8.2)  g/dL


 


Albumin     (3.5-5.0)  g/dL


 


Amylase     ()  U/L


 


Lipase     ()  U/L


 


Urine Protein  Trace H    (Negative)  


 


Urine Ketones  2+ H    (Negative)  


 


Urine Blood  Small H    (Negative)  


 


Urine Bacteria  Rare H    (None)  /hpf


 


Urine Mucus  Occasional H    (None)  /hpf


 


C. difficile (EIA) Intrp     (Negative)  














  06/21/18 06/21/18 Range/Units





  06:10 06:10 


 


WBC  11.8 H   (3.8-10.6)  k/uL


 


Hgb  12.8 L   (13.0-17.5)  gm/dL


 


Hct  38.3 L   (39.0-53.0)  %


 


Neutrophils #    (1.3-7.7)  k/uL


 


Lymphocytes #    (1.0-4.8)  k/uL


 


Monocytes #  1.4 H   (0-1.0)  k/uL


 


Glucose    (74-99)  mg/dL


 


AST   16 L  (17-59)  U/L


 


Total Creatine Kinase    ()  U/L


 


Total Protein   5.2 L  (6.3-8.2)  g/dL


 


Albumin   2.8 L  (3.5-5.0)  g/dL


 


Amylase    ()  U/L


 


Lipase    ()  U/L


 


Urine Protein    (Negative)  


 


Urine Ketones    (Negative)  


 


Urine Blood    (Negative)  


 


Urine Bacteria    (None)  /hpf


 


Urine Mucus    (None)  /hpf


 


C. difficile (EIA) Intrp    (Negative)  








 Microbiology - Last 24 Hours (Table)











 06/20/18 12:24 Stool Culture - Preliminary





 Stool 














Assessment and Plan


Plan: 


1 Severe abdominal pain: Most likely C. diff colitis with worsening symptom and 

recurrent will continue Dificid along with Questran and probiotics, infectious 

disease on consult.





2 leukocytosis: Awaiting for culture no need for any other antibiotic at this 

point.





3 CAD: With recurrent chest pain continue with troponin 3 this time repeat EKG 

and cardiology was consulted and cleared. 





4 hypertension: Patient has been doing well on atenolol 25 mg twice a day and 

Vasotec 2.5 g a day.





5 hyperlipidemia: Has been on atorvastatin 80 mg daily.





6 BPH with worsening symptoms continue patient on Detrol LA 4 mg daily at 

bedtime.





7 GERD/GI prophylaxis: Patient was switched to pantoprazole IV.





8 DVT prophylaxis: Early mobilization and knee-high ZHANG hose.





CODE STATUS: Full code.





Admit patient to inpatient status for more than 2 nights.





The above impression and plan of care have been discussed and directed by 

signing physician. Tabitha Akbar nurse practitioner acting as scribe for 

signing physician.

## 2018-06-22 VITALS — TEMPERATURE: 97.7 F | DIASTOLIC BLOOD PRESSURE: 86 MMHG | HEART RATE: 68 BPM | SYSTOLIC BLOOD PRESSURE: 138 MMHG

## 2018-06-22 LAB
ALBUMIN SERPL-MCNC: 2.9 G/DL (ref 3.5–5)
ALP SERPL-CCNC: 71 U/L (ref 38–126)
ALT SERPL-CCNC: 38 U/L (ref 21–72)
ANION GAP SERPL CALC-SCNC: 7 MMOL/L
AST SERPL-CCNC: 30 U/L (ref 17–59)
BASOPHILS # BLD AUTO: 0.1 K/UL (ref 0–0.2)
BASOPHILS NFR BLD AUTO: 1 %
BUN SERPL-SCNC: 9 MG/DL (ref 9–20)
CALCIUM SPEC-MCNC: 8.4 MG/DL (ref 8.4–10.2)
CHLORIDE SERPL-SCNC: 109 MMOL/L (ref 98–107)
CO2 SERPL-SCNC: 24 MMOL/L (ref 22–30)
EOSINOPHIL # BLD AUTO: 0.2 K/UL (ref 0–0.7)
EOSINOPHIL NFR BLD AUTO: 4 %
ERYTHROCYTE [DISTWIDTH] IN BLOOD BY AUTOMATED COUNT: 4.65 M/UL (ref 4.3–5.9)
ERYTHROCYTE [DISTWIDTH] IN BLOOD: 14.5 % (ref 11.5–15.5)
GLUCOSE SERPL-MCNC: 105 MG/DL (ref 74–99)
HCT VFR BLD AUTO: 40.1 % (ref 39–53)
HGB BLD-MCNC: 13.2 GM/DL (ref 13–17.5)
LYMPHOCYTES # SPEC AUTO: 1.6 K/UL (ref 1–4.8)
LYMPHOCYTES NFR SPEC AUTO: 24 %
MCH RBC QN AUTO: 28.4 PG (ref 25–35)
MCHC RBC AUTO-ENTMCNC: 33 G/DL (ref 31–37)
MCV RBC AUTO: 86.1 FL (ref 80–100)
MONOCYTES # BLD AUTO: 0.7 K/UL (ref 0–1)
MONOCYTES NFR BLD AUTO: 11 %
NEUTROPHILS # BLD AUTO: 3.9 K/UL (ref 1.3–7.7)
NEUTROPHILS NFR BLD AUTO: 57 %
PLATELET # BLD AUTO: 218 K/UL (ref 150–450)
POTASSIUM SERPL-SCNC: 3.5 MMOL/L (ref 3.5–5.1)
PROT SERPL-MCNC: 5.3 G/DL (ref 6.3–8.2)
SODIUM SERPL-SCNC: 140 MMOL/L (ref 137–145)
WBC # BLD AUTO: 6.8 K/UL (ref 3.8–10.6)

## 2018-06-22 RX ADMIN — FIDAXOMICIN SCH MG: 200 TABLET, FILM COATED ORAL at 09:13

## 2018-06-22 RX ADMIN — Medication SCH UNIT: at 09:14

## 2018-06-22 RX ADMIN — CEFAZOLIN SCH MLS/HR: 330 INJECTION, POWDER, FOR SOLUTION INTRAMUSCULAR; INTRAVENOUS at 09:14

## 2018-06-22 RX ADMIN — CHOLESTYRAMINE SCH GM: 4 POWDER, FOR SUSPENSION ORAL at 09:13

## 2018-06-22 RX ADMIN — ATENOLOL SCH MG: 25 TABLET ORAL at 09:14

## 2018-06-22 RX ADMIN — ASPIRIN 325 MG ORAL TABLET SCH MG: 325 PILL ORAL at 09:14

## 2018-06-22 RX ADMIN — PANTOPRAZOLE SODIUM SCH MG: 40 INJECTION, POWDER, FOR SOLUTION INTRAVENOUS at 09:13

## 2018-06-22 NOTE — P.DS
Providers


Date of admission: 


06/20/18 14:19





Attending physician: 


David Arredondo





Consults: 





 





06/20/18 14:21


Consult Physician Stat 


   Consulting Provider: Thomas Tamez


   Consult Reason/Comments: Chest pain


   Do you want consulting provider notified?: Yes





06/20/18 17:03


Consult Physician Routine 


   Consulting Provider: David Mccray


   Consult Reason/Comments: C Diff


   Do you want consulting provider notified?: Yes











Primary care physician: 


Mavis Mustafa





Hospital Course: 





73-year-old  male one of Dr. Mustafa's patient with past medical history 

of CAD post MI post angioplasty and stent placement, history of hypertension, 

hyperlipidemia and osteoarthritis who apparently spend the winter in [place] 

developed to have thrombosed hemorrhoid require oral antibiotic for.  This time 

also had the influenza was treated with Tamiflu when according to him had 

antibiotics will more time.  Patient was treated with his ophthalmologist 

recently with the 10 days worth of amoxicillin which finally gave him severe 

episode of smelly diarrhea was diagnosed with C. diff as an outpatient and 

treated for 14 days with Flagyl and Questran.  Patient done well ~last 5 days 

when he developed to have worsening watery diarrhea going over 5-15 times a day 

with worsening symptom consistent with abdominal pain severe dehydration 

lightheadedness.  Patient developed to have beside his increase abdominal 

discomfort and diarrhea and episodes chest pain today.  Ended up coming to the 

emergency department at [facility] where was seen and evaluated his 

troponin and EKG didn't show any abnormality, chest x-ray showed atelectasis 

with no sign of infiltrate.  KUB showed nonspecific abdomen with differential 

include an ileus or enteritis.  Lab value shows positive C. diff also 

significantly elevated WBC.  Patient was started on Flagyl hydration Will add 

vancomycin suspension and admitted to the hospital with above problem.





6/21: Stool culture still in process, C.Diff was positive, he continues on 

Dificid.  He reports he is still having multiple bouts of loose stools.  White 

count slightly elevated at 11.8, vital signs are stable, infectious disease on 

consult.  He denies any further episodes of chest pain, troponins 3 have been 

negative. Cardiology was consulted, recommend medical management and cleared 

from a cardiology standpoint. 





6/22: Patient was a clear by cardiology, no further workup for chest pain or 

angina needed this point, troponin were negative.


His diarrhea is much better patient was seen Dr. Mccray continue on Dificid, 

and further plan for outpatient treatment and whether to continue the same 

medication or switch to vancomycin suspension with tapering dose to be made by 

Dr. Mccray before the end of the day today.








Assessment and Plan


Plan: 


1 Severe abdominal pain: Most likely C. diff colitis with worsening symptom and 

recurrent will continue Dificid along with Questran and probiotics, infectious 

disease on consult.





2 severe recurrent C. diff: Patient had respond to Dificid very well so far and 

further management will be finalized by Dr. Mccray today for either to continue 

medication for full course or to start him on vancomycin suspension with 

tapering dose for the next 6 weeks.





3 CAD: With recurrent chest pain continue with troponin 3 this time repeat EKG 

and cardiology was consulted and cleared.  Patient was cleared by cardiology no 

need for any further testing at this point.  





4 hypertension: Patient has been doing well on atenolol 25 mg twice a day and 

Vasotec 2.5 g a day.





5 hyperlipidemia: Has been on atorvastatin 80 mg daily.





6 BPH with worsening symptoms continue patient on Detrol LA 4 mg daily at 

bedtime.





7 leukocytosis: Awaiting for culture no need for any other antibiotic at this 

point.





8 GERD/GI prophylaxis: Patient was switched to pantoprazole IV.





9 DVT prophylaxis: Early mobilization and knee-high ZHANG hose.








Patient be discharged home today to follow-up with Dr. mccray and Dr. Mustafa as 

an outpatient.


Patient Condition at Discharge: Stable





Plan - Discharge Summary


New Discharge Prescriptions: 


New


   Acetaminophen Tab [Tylenol] 650 mg PO Q6HR PRN  tab


     PRN Reason: Mild Pain Or Fever > 100.5


   Cholestyramine (with Sugar) [Questran Packet] 4 gm PO BID@1000,1800 #60 

packet


   Ibuprofen [Motrin] 400 mg PO Q6HR PRN  tab


     PRN Reason: Mild Pain Or Fever > 100.5


   Fidaxomicin [Dificid] 200 mg PO BID #20 tablet


   Vancomycin Oral Solution 250 mg PO Q6HR #280 ml





Continue


   Ezetimibe [Zetia] 10 mg PO HS


   Cholecalciferol [Vitamin D3] 1,000 unit PO DAILY


   Omeprazole 20 mg PO DAILY


   Enalapril [Vasotec] 2.5 mg PO HS


   Atorvastatin [Lipitor] 80 mg PO HS


   Aspirin 325 mg PO DAILY


   Atenolol 25 mg PO BID


   Tolterodine Tartrate [Detrol LA] 4 mg PO HS


Discharge Medication List





Aspirin 325 mg PO DAILY 11/28/16 [History]


Atenolol 25 mg PO BID 11/28/16 [History]


Atorvastatin [Lipitor] 80 mg PO HS 11/28/16 [History]


Cholecalciferol [Vitamin D3] 1,000 unit PO DAILY 11/28/16 [History]


Enalapril [Vasotec] 2.5 mg PO HS 11/28/16 [History]


Ezetimibe [Zetia] 10 mg PO HS 11/28/16 [History]


Omeprazole 20 mg PO DAILY 11/28/16 [History]


Tolterodine Tartrate [Detrol LA] 4 mg PO HS 06/20/18 [History]


Acetaminophen Tab [Tylenol] 650 mg PO Q6HR PRN  tab 06/22/18 [Rx]


Cholestyramine (with Sugar) [Questran Packet] 4 gm PO BID@1000,1800 #60 packet 

06/22/18 [Rx]


Fidaxomicin [Dificid] 200 mg PO BID #20 tablet 06/22/18 [Rx]


Ibuprofen [Motrin] 400 mg PO Q6HR PRN  tab 06/22/18 [Rx]


Vancomycin Oral Solution 250 mg PO Q6HR #280 ml 06/22/18 [Rx]








Follow up Appointment(s)/Referral(s): 


Mavis Mustafa MD [Primary Care Provider] - 06/28/18 11:00 am (Thursday with Dr. Hodges)


David Mccray MD [STAFF PHYSICIAN] - 07/13/18 10:30 am


Patient Instructions/Handouts:  Clostridium Difficile Infection (DC)

## 2018-06-22 NOTE — P.CONS
History of Present Illness





- Reason for Consult


Consult date: 06/21/18





- Chief Complaint


Diarrhea





- History of Present Illness


Leta 73-year-old  male presents to Hospital with ongoing 

significant abdominal pain frequently loose stools and low-grade fever 

generalized malaise and lack of improvement however he then developed 

significant and worsening abdominal pain with crampy abdominal pain associated 

with high volume diarrhea fortunately without significant hematochezia.  With 

this he had further evaluation and because of his failure of outpatient 

treatment was admitted to hospital for further intervention.  With the 

recurrent Clostridium difficile colitis the infectious diseases consultation 

was requested.  in the outpatient setting.  This pleasant gentleman relates 

that he has had significant underlying medical troubles earlier this year.  He 

wintered in [place] in there he had complications that included an infected 

rectal hemorrhoid requiring antibiotic therapy.  Given developed influenza and 

was treated with Tamiflu and antibiotic therapy.  He eventually did improve and 

his come back to his home here in [place] and then developed difficulties with 

his eyelids.  He did have a surgical procedure performed to improve his lipids 

to allow his eyes to close and appropriately keep the cornea moist and clear 

and was given a course of antibiotic therapy with he thinks his amoxicillin.  

Shortly after he started to develop some abdominal discomforts and frequent 

loose stools.  Testing was performed outpatient in the outpatient setting and 

clostridium difficile toxin was found.  He was treated with a course of oral 

metronidazole.  Because of the failure of antibiotic therapy was admitted and 

the infectious diseases consultation was requested.








Review of Systems


HEENT:Denies headache or acute visual change.  Denies sinus or mouth 

discomforts.  Denies neck stiffness or pain.  Denies significant oral cavity 

pain.  Denies difficulty on swallowing.





Lungs: Denies significant shortness of breath, cough, sputum production, or 

hemoptysis.





Cardiovascular: Denies significant shortness of breath, chest pain, chest wall 

pain, 


orthopnea, dyspnea on exertion, syncope





Gastrointestinal: Patient is denying nausea or emesis but did have significant 

diarrhea as per the HPI he denies hematemesis melena or hematochezia.  No acute 

pain from his recent hemorrhoid








Musculoskeletal: denies significant myalgias or arthralgias.  No new joint 

swelling.  Denies new back pain.





Skin: Denies new rash or lesions.  No new ulcers or wounds are related..





Neuro: Denies headache or visual change.  Denies any new onset weakness or 

difficulty with ambulation.  Denies falls or seizures.





Psychiatric:Denies anxiety or depression.





Endocrine: Denies significant fatigue, denies significant weight loss or weight 

gain.




















Past Medical History


Past Medical History: GERD/Reflux, Hyperlipidemia, Hypertension, Myocardial 

Infarction (MI), Osteoarthritis (OA)


Additional Past Medical History / Comment(s): Hiatal Hernia, blood clot in rt 

arm yrs ago, hiatal hernia, vertigo


Last Myocardial Infarction Date:: 2005


History of Any Multi-Drug Resistant Organisms: C-DIFF


Year Discovered:: 6/20/2018


MDRO Source:: stool


Past Surgical History: Heart Catheterization With Stent, Orthopedic Surgery


Additional Past Surgical History / Comment(s): orchiectomy, colonoscopy; EGD, 

lilly hand carpal tunnel, lilly shoulders surgery


Past Anesthesia/Blood Transfusion Reactions: Previous Problems w/ Anesthesia


Additional Past Anesthesia/Blood Transfusion Reaction / Comm: .


Date of Last Stent Placement:: 2005


Past Psychological History: No Psychological Hx Reported


Additional Psychological History / Comment(s):  and lives in the family 

home with his wife.  Retired .  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  Did travels [employer] 

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  Did live in [place] in the past.  

Reformed smokerand alcohol use no recreational drug use.  2 petDogs in the home


Smoking Status: Former smoker


Past Alcohol Use History: Occasional


Additional Past Alcohol Use History / Comment(s): quit smoking 30 yrs ago, 

smoked for 20 yrs


Past Drug Use History: None Reported





- Past Family History


  ** Sister(s)


Family Medical History: Cancer





  ** Daughter(s)


Family Medical History: Cancer





Medications and Allergies


Home Medications and Allergies Comment(s): 





 Current Medications





Acetaminophen (Tylenol Tab)  650 mg PO Q6HR PRN


   PRN Reason: Mild Pain or Fever > 100.5


Alprazolam (Xanax)  0.25 mg PO Q6HR PRN


   PRN Reason: Anxiety


Aspirin (Aspirin)  325 mg PO DAILY [facility]


   Last Admin: 06/21/18 09:11 Dose:  325 mg


Atenolol (Tenormin)  25 mg PO BID [facility]


   Last Admin: 06/21/18 20:24 Dose:  25 mg


Atorvastatin Calcium (Lipitor)  80 mg PO HS [facility]


   Last Admin: 06/21/18 20:23 Dose:  80 mg


Cholecalciferol (Vitamin D3)  1,000 unit PO DAILY [facility]


   Last Admin: 06/21/18 09:11 Dose:  1,000 unit


Cholestyramine Resin (Questran)  4 gm PO BID@1000,1800 [facility]


   Last Admin: 06/21/18 17:45 Dose:  4 gm


Ezetimibe (Zetia)  10 mg PO HS [facility]


   Last Admin: 06/21/18 20:23 Dose:  10 mg


Fidaxomicin (Dificid)  200 mg PO BID [facility]


   Stop: 06/30/18 23:01


   Last Admin: 06/21/18 20:24 Dose:  200 mg


Sodium Chloride (Saline 0.9%)  1,000 mls @ 120 mls/hr IV .Q8H20M [facility]


   Last Admin: 06/21/18 23:32 Dose:  120 mls/hr


Ibuprofen (Motrin)  400 mg PO Q6HR PRN


   PRN Reason: Mild Pain or Fever > 100.5


Ketorolac Tromethamine (Toradol)  30 mg IVP Q6HR PRN


   PRN Reason: Moderate Pain


   Stop: 06/25/18 14:20


Lisinopril (Zestril)  5 mg PO [facility]


   Last Admin: 06/21/18 20:23 Dose:  5 mg


Morphine Sulfate (Morphine Sulfate (Inj))  4 mg IV Q4HR PRN


   PRN Reason: Severe Pain


Naloxone HCl (Narcan)  0.2 mg IV Q2M PRN


   PRN Reason: Opioid Reversal


Ondansetron HCl (Zofran)  4 mg IVP Q8HR PRN


   PRN Reason: Nausea And Vomiting


Pantoprazole Sodium (Protonix)  40 mg IV DAILY [facility]


   Last Admin: 06/21/18 09:11 Dose:  40 mg








 Home Medications











 Medication  Instructions  Recorded  Confirmed  Type


 


Aspirin 325 mg PO DAILY 11/28/16 06/20/18 History


 


Atenolol 25 mg PO BID 11/28/16 06/20/18 History


 


Atorvastatin [Lipitor] 80 mg PO  11/28/16 06/20/18 History


 


Cholecalciferol [Vitamin D3] 1,000 unit PO DAILY 11/28/16 06/20/18 History


 


Enalapril [Vasotec] 2.5 mg PO HS 11/28/16 06/20/18 History


 


Ezetimibe [Zetia] 10 mg PO  11/28/16 06/20/18 History


 


Omeprazole 20 mg PO DAILY 11/28/16 06/20/18 History


 


Tolterodine Tartrate [Detrol LA] 4 mg PO  06/20/18 06/20/18 History











 Allergies











Allergy/AdvReac Type Severity Reaction Status Date / Time


 


meperidine [From Demerol] Allergy  UNRESPONSIV Verified 06/20/18 12:56





   E  














Physical Exam


Vitals: 


 Vital Signs











  Temp Pulse Resp BP Pulse Ox


 


 06/21/18 15:49  97.1 F L  73  18  121/74  95


 


 06/21/18 11:41  97.3 F L  80  18  128/67  94 L


 


 06/21/18 08:00  97.4 F L  78  18  138/84  94 L


 


 06/21/18 04:00  96.7 F L  81  16  128/78  96








 Intake and Output











 06/21/18 06/21/18 06/22/18





 14:59 22:59 06:59


 


Intake Total 1022 118 


 


Balance 1022 118 


 


Intake:   


 


  Intake, IV Titration 800  





  Amount   


 


    Sodium Chloride 0.9% 1, 800  





    000 ml @ 120 mls/hr IV .   





    Q8H20M MARÍA Rx#:[id number]   


 


  Oral 222 118 


 


Other:   


 


  # Bowel Movements 3  











Pleasant 73-year-old male who is not in severe distress, in that he is feeling 

somewhat better with hydration and medication.  Abdominal pain is improving.


HEENT: Anicteric conjunctiva are pink and moist nasal mucosa grossly intact 

without significant lesions, there is no thrush.


Neck: The neck is supple without significant lymphadenopathy or thyromegaly.


Lungs: Good bilateral air entry without significant crackles or wheezing.  

There is no significant bronchial sounds.  There is no egophony or dullness.


Heart: Regular rate and rhythm with an audible S1-S2, no S3 no S4.  There is no 

significant murmur click or rub, PMI was nondisplaced.


Abdomen: Positive bowel sounds soft with some generalized tenderness especially 

left lower quadrant but without palpable mass and no organomegaly There was no 

guarding or rebound.


Extremities: The upper extremities have excellent pulses they are symmetric, no 

significant petechiae or telangiectasia.  No splinter hemorrhages were noted.  

The lower extremities are free from significant edema.  The peripheral pulses 

were 2+ and symmetric.


Neuro: Awake alert oriented to person place and time.  There are no acute new 

gross focal sensory motor deficits.











Results


CBC & Chem 7: 


 06/21/18 06:10





 06/21/18 06:10


Labs: 


 Abnormal Lab Results - Last 24 Hours (Table)











  06/20/18 06/21/18 06/21/18 Range/Units





  23:24 06:10 06:10 


 


WBC   11.8 H   (3.8-10.6)  k/uL


 


Hgb   12.8 L   (13.0-17.5)  gm/dL


 


Hct   38.3 L   (39.0-53.0)  %


 


Monocytes #   1.4 H   (0-1.0)  k/uL


 


AST    16 L  (17-59)  U/L


 


Total Creatine Kinase  40 L    ()  U/L


 


Total Protein    5.2 L  (6.3-8.2)  g/dL


 


Albumin    2.8 L  (3.5-5.0)  g/dL








 Laboratory Results











WBC  11.8 k/uL (3.8-10.6)  H  06/21/18  06:10    


 


RBC  4.56 m/uL (4.30-5.90)   06/21/18  06:10    


 


Hgb  12.8 gm/dL (13.0-17.5)  L  06/21/18  06:10    


 


Hct  38.3 % (39.0-53.0)  L  06/21/18  06:10    


 


MCV  84.1 fL (80.0-100.0)   06/21/18  06:10    


 


MCH  28.1 pg (25.0-35.0)   06/21/18  06:10    


 


MCHC  33.5 g/dL (31.0-37.0)   06/21/18  06:10    


 


RDW  14.3 % (11.5-15.5)   06/21/18  06:10    


 


Plt Count  216 k/uL (150-450)   06/21/18  06:10    


 


Neutrophils %  66 %  06/21/18  06:10    


 


Lymphocytes %  16 %  06/21/18  06:10    


 


Monocytes %  12 %  06/21/18  06:10    


 


Eosinophils %  2 %  06/21/18  06:10    


 


Basophils %  1 %  06/21/18  06:10    


 


Neutrophils #  7.7 k/uL (1.3-7.7)   06/21/18  06:10    


 


Lymphocytes #  1.9 k/uL (1.0-4.8)   06/21/18  06:10    


 


Monocytes #  1.4 k/uL (0-1.0)  H  06/21/18  06:10    


 


Eosinophils #  0.2 k/uL (0-0.7)   06/21/18  06:10    


 


Basophils #  0.1 k/uL (0-0.2)   06/21/18  06:10    


 


Sodium  139 mmol/L (137-145)   06/21/18  06:10    


 


Potassium  3.6 mmol/L (3.5-5.1)   06/21/18  06:10    


 


Chloride  105 mmol/L ()   06/21/18  06:10    


 


Carbon Dioxide  25 mmol/L (22-30)   06/21/18  06:10    


 


Anion Gap  9 mmol/L  06/21/18  06:10    


 


BUN  11 mg/dL (9-20)   06/21/18  06:10    


 


Creatinine  0.79 mg/dL (0.66-1.25)   06/21/18  06:10    


 


Est GFR (CKD-EPI)AfAm  >90  (>60 ml/min/1.73 sqM)   06/21/18  06:10    


 


Est GFR (CKD-EPI)NonAf  >90  (>60 ml/min/1.73 sqM)   06/21/18  06:10    


 


Glucose  99 mg/dL (74-99)   06/21/18  06:10    


 


Calcium  8.5 mg/dL (8.4-10.2)   06/21/18  06:10    


 


Total Bilirubin  1.0 mg/dL (0.2-1.3)   06/21/18  06:10    


 


AST  16 U/L (17-59)  L  06/21/18  06:10    


 


ALT  30 U/L (21-72)   06/21/18  06:10    


 


Alkaline Phosphatase  78 U/L ()   06/21/18  06:10    


 


Total Creatine Kinase  40 U/L ()  L  06/20/18  23:24    


 


CK-MB (CK-2)  0.5 ng/mL (0.0-2.4)   06/20/18  23:24    


 


CK-MB (CK-2) Rel Index  1.3   06/20/18  23:24    


 


Troponin I  <0.012 ng/mL (0.000-0.034)   06/20/18  23:24    


 


Total Protein  5.2 g/dL (6.3-8.2)  L  06/21/18  06:10    


 


Albumin  2.8 g/dL (3.5-5.0)  L  06/21/18  06:10    


 


Amylase  <30 U/L ()  L  06/20/18  11:00    


 


Lipase  20 U/L ()  L  06/20/18  11:00    


 


Urine Color  Yellow   06/20/18  12:30    


 


Urine Appearance  Clear  (Clear)   06/20/18  12:30    


 


Urine pH  5.5  (5.0-8.0)   06/20/18  12:30    


 


Ur Specific Gravity  1.013  (1.001-1.035)   06/20/18  12:30    


 


Urine Protein  Trace  (Negative)  H  06/20/18  12:30    


 


Urine Glucose (UA)  Negative  (Negative)   06/20/18  12:30    


 


Urine Ketones  2+  (Negative)  H  06/20/18  12:30    


 


Urine Blood  Small  (Negative)  H  06/20/18  12:30    


 


Urine Nitrite  Negative  (Negative)   06/20/18  12:30    


 


Urine Bilirubin  Negative  (Negative)   06/20/18  12:30    


 


Urine Urobilinogen  <2.0 mg/dL (<2.0)   06/20/18  12:30    


 


Ur Leukocyte Esterase  Negative  (Negative)   06/20/18  12:30    


 


Urine RBC  2 /hpf (0-5)   06/20/18  12:30    


 


Urine WBC  1 /hpf (0-5)   06/20/18  12:30    


 


Ur Squamous Epith Cells  <1 /hpf (0-4)   06/20/18  12:30    


 


Urine Bacteria  Rare /hpf (None)  H  06/20/18  12:30    


 


Urine Mucus  Occasional /hpf (None)  H  06/20/18  12:30    


 


C. difficile (EIA) Intrp  Positive  (Negative)  A  06/20/18  12:24    














Assessment and Plan


(1) Abdominal pain


Current Visit: Yes   Status: Acute   Code(s): R10.9 - UNSPECIFIED ABDOMINAL 

PAIN   SNOMED Code(s): 82481932


   





(2) C. difficile colitis


Narrative/Plan: 


73-year-old  male presents to Hospital from home with increasing 

abdominal pain associated with some nausea without emesis severe amounts of 

diarrhea.  The outpatient setting he had evidence of Clostridium difficile 

colitis and was treated with a course of antibiotic therapy.  Despite that he 

has worsening, current toxin remains positive and constantly with failure of 

outpatient treatment he was brought into hospital.  With this difficulty 

antimicrobial therapy was altered to Dificid to improve the likelihood of a 

rapid resolution of his infection and to prevent further recurrence.  Probiotic 

therapy is offered.  Questran is being given to bind toxin and to improve the 

consistency of the stool to improve his symptomatology.  He is currently not 

having high-grade fevers but he was he is not having chills at this time this 

has resolved and he is denying other acute new symptoms at this time.  He was 

having some atypical chest pain has been seen by cardiology and they believe it 

is just chest wall pain.  The plan will be for him to complete his course of 

Dificid, continue with the Questran to firm up the stool and bind toxin.  

Hopefully will rapidly responding we will finish this course of therapy in the 

outpatient setting.


Current Visit: Yes   Status: Acute   Code(s): A04.72 - ENTEROCOLITIS D/T 

CLOSTRIDIUM DIFFICILE, NOT SPCF AS RECUR   SNOMED Code(s): 541277527


   





(3) Anemia


Current Visit: Yes   Status: Acute   Code(s): D64.9 - ANEMIA, UNSPECIFIED   

SNOMED Code(s): 558998204

## 2018-09-14 ENCOUNTER — HOSPITAL ENCOUNTER (EMERGENCY)
Dept: HOSPITAL 47 - EC | Age: 73
Discharge: HOME | End: 2018-09-14
Payer: MEDICARE

## 2018-09-14 VITALS — SYSTOLIC BLOOD PRESSURE: 130 MMHG | HEART RATE: 63 BPM | RESPIRATION RATE: 16 BRPM | DIASTOLIC BLOOD PRESSURE: 70 MMHG

## 2018-09-14 VITALS — TEMPERATURE: 97.9 F
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
## 2018-09-14 DIAGNOSIS — K21.9: ICD-10-CM

## 2018-09-14 DIAGNOSIS — I25.10: ICD-10-CM

## 2018-09-14 DIAGNOSIS — M54.16: ICD-10-CM

## 2018-09-14 DIAGNOSIS — M19.90: ICD-10-CM

## 2018-09-14 DIAGNOSIS — I25.2: ICD-10-CM

## 2018-09-14 DIAGNOSIS — Z88.5: ICD-10-CM

## 2018-09-14 DIAGNOSIS — Z79.899: ICD-10-CM

## 2018-09-14 DIAGNOSIS — Z95.5: ICD-10-CM

## 2018-09-14 DIAGNOSIS — S39.012A: Primary | ICD-10-CM

## 2018-09-14 DIAGNOSIS — Z87.891: ICD-10-CM

## 2018-09-14 DIAGNOSIS — Z79.82: ICD-10-CM

## 2018-09-14 DIAGNOSIS — E78.5: ICD-10-CM

## 2018-09-14 DIAGNOSIS — I10: ICD-10-CM

## 2018-09-14 PROCEDURE — 73502 X-RAY EXAM HIP UNI 2-3 VIEWS: CPT

## 2018-09-14 PROCEDURE — 99283 EMERGENCY DEPT VISIT LOW MDM: CPT

## 2018-09-14 PROCEDURE — 72110 X-RAY EXAM L-2 SPINE 4/>VWS: CPT

## 2018-09-14 NOTE — ED
General Adult HPI





- General


Chief complaint: Extremity Injury, Lower


Stated complaint: Hip pain


Time Seen by Provider: 09/14/18 08:11


Source: patient, RN notes reviewed


Mode of arrival: ambulatory


Limitations: physical limitation





- History of Present Illness


Initial comments: 





73-year-old male presents emergency department left hip, low back pain.  

Patient states started after he believes he was on the lawn more for 3+ hours.  

Patient states that he does this on a regular basis.  He has had on-and-off 

problems with his hip and back.  Patient states pain has been present for last 

2 days.  Denies any procedures of his lower extremities denies any bowel, 

bladder incontinence or retention.  He has no abdominal pain.





- Related Data


 Home Medications











 Medication  Instructions  Recorded  Confirmed


 


Aspirin 325 mg PO DAILY 11/28/16 09/14/18


 


Atenolol 25 mg PO BID 11/28/16 09/14/18


 


Atorvastatin [Lipitor] 80 mg PO HS 11/28/16 09/14/18


 


Cholecalciferol [Vitamin D3] 1,000 unit PO DAILY 11/28/16 09/14/18


 


Ezetimibe [Zetia] 10 mg PO HS 11/28/16 09/14/18


 


Omeprazole 20 mg PO DAILY 11/28/16 09/14/18


 


Tolterodine Tartrate [Detrol LA] 4 mg PO HS 06/20/18 09/14/18


 


Enalapril [Vasotec] 2.5 mg PO DAILY 09/14/18 09/14/18


 


L.acidoph,Paracasei, B.lactis 1 cap PO BID 09/14/18 09/14/18





[Probiotic]   








 Previous Rx's











 Medication  Instructions  Recorded


 


Cyclobenzaprine [Flexeril] 10 mg PO TID PRN #15 tab 09/14/18


 


Hydrocodone/Acetaminophen [Norco 1 tab PO Q6HR PRN #12 tab 09/14/18





5-325]  


 


predniSONE 50 mg PO DAILY #5 tab 09/14/18











 Allergies











Allergy/AdvReac Type Severity Reaction Status Date / Time


 


meperidine [From Demerol] Allergy  UNRESPONSIV Verified 09/14/18 09:01





   E  














Review of Systems


ROS Statement: 


Those systems with pertinent positive or pertinent negative responses have been 

documented in the HPI.





ROS Other: All systems not noted in ROS Statement are negative.





Past Medical History


Past Medical History: Coronary Artery Disease (CAD), GERD/Reflux, Hyperlipidemia

, Hypertension, Myocardial Infarction (MI), Osteoarthritis (OA)


Additional Past Medical History / Comment(s): Hiatal Hernia, blood clot in rt 

arm yrs ago, hiatal hernia, vertigo


Last Myocardial Infarction Date:: 2005


History of Any Multi-Drug Resistant Organisms: C-DIFF


Date of last positivie culture/infection: 6/20/2018


MDRO Source:: stool


Past Surgical History: Heart Catheterization With Stent, Orthopedic Surgery


Additional Past Surgical History / Comment(s): orchiectomy, colonoscopy; EGD, 

lilly hand carpal tunnel, lilly shoulders surgery


Past Anesthesia/Blood Transfusion Reactions: Previous Problems w/ Anesthesia


Additional Past Anesthesia/Blood Transfusion Reaction / Comment(s): .


Date of Last Stent Placement:: 2005


Past Psychological History: No Psychological Hx Reported


Smoking Status: Former smoker


Past Alcohol Use History: Occasional


Past Drug Use History: None Reported





- Past Family History


  ** Sister(s)


Family Medical History: Cancer





  ** Daughter(s)


Family Medical History: Cancer





General Exam


Limitations: physical limitation


General appearance: alert, in no apparent distress


Head exam: Present: atraumatic, normocephalic, normal inspection


Eye exam: Present: normal appearance, PERRL, EOMI.  Absent: scleral icterus, 

conjunctival injection, periorbital swelling


Respiratory exam: Present: normal lung sounds bilaterally.  Absent: respiratory 

distress, wheezes, rales, rhonchi, stridor


Cardiovascular Exam: Present: regular rate, normal rhythm, normal heart sounds.

  Absent: systolic murmur, diastolic murmur, rubs, gallop, clicks


Extremities exam: Present: other (Mild tenderness to the lateral superior 

aspect of the hip and the left, patient does have full range of motion no pain 

with logrolling minimal discomfort with Anoop's test leg is neurovascularly 

intact equal warmth equal color)


Back exam: Present: full ROM, tenderness, paraspinal tenderness.  Absent: CVA 

tenderness (R), CVA tenderness (L), vertebral tenderness


Skin exam: Present: warm, dry, intact, normal color.  Absent: rash





Course


 Vital Signs











  09/14/18





  08:00


 


Temperature 97.9 F


 


Pulse Rate 71


 


Respiratory 18





Rate 


 


Blood Pressure 129/84


 


O2 Sat by Pulse 95





Oximetry 














Medical Decision Making





- Medical Decision Making





73-year-old male presented for left hip low back pain.  Patient is a lumbar 

strain and lumbar repeat The symptoms.  Patient will be started on prednisone, 

Flexeril.  He'll follow-up was primary care physician he'll apply heat and ice 

to his low back and return for any worsening symptoms.  He had no red flag 

symptoms no neurological deficits.





Disposition


Clinical Impression: 


 Lumbar radiculopathy, acute, Lumbar strain, Left hip pain





Disposition: HOME SELF-CARE


Condition: Stable


Instructions:  Lumbar Radiculopathy (ED)


Additional Instructions: 


Please return to the Emergency Department if symptoms worsen or any other 

concerns.


Prescriptions: 


Cyclobenzaprine [Flexeril] 10 mg PO TID PRN #15 tab


 PRN Reason: Muscle Spasm


Hydrocodone/Acetaminophen [Norco 5-325] 1 tab PO Q6HR PRN #12 tab


 PRN Reason: Pain


predniSONE 50 mg PO DAILY #5 tab


Is patient prescribed a controlled substance at d/c from ED?: Yes


When asked, does pt state using other controlled substances?: No


If prescribed controlled substance>3 days was MAPS reviewed?: Prescribed <3 Days


If opioid is for acute pain is fill amount 7 days or less?: Yes


If Rx opioid, was Start Talking consent form obtained?: Yes


Referrals: 


Mavis Mustafa MD [Primary Care Provider] - 1-2 days


Time of Disposition: 09:12

## 2018-09-14 NOTE — XR
EXAM TYPE: LUMBAR SPINE X RAY SERIES

 

COMPARISON: NONE

 

HISTORY: Pain

 

TECHNIQUE: 4 views are submitted.

 

FINDINGS:

Alignment is anatomic.  The pedicles are intact.  The transverse processes are intact.  There is no s
pondylolysis or spondylolisthesis.  Hypertrophic and degenerative changes are seen at all levels with
 most marked findings at L4-S1 and T12-L2. Hypertrophic spurring are seen at all levels. There is art
hropathy particularly noted at L4-5 and L5-S1

 

IMPRESSION:

1. Multilevel degenerative disc disease.

## 2018-09-14 NOTE — XR
EXAMINATION TYPE: XR Hip LT and AP Pelvis

 

DATE OF EXAM: 9/14/2018

 

COMPARISON: NONE

 

HISTORY: Pelvic and left hip pain for 2 weeks.

 

TECHNIQUE: A single AP view of the pelvis is obtained. Two views of the left hip are obtained.  

 

FINDINGS:  There is no acute fracture/dislocation evident in the pelvis.  The hip and sacroiliac join
ts appear symmetric and unremarkable. There is suspected stent graft in the right iliac artery.

 

Two views of left hip show no acute fracture or dislocation.  No focal lytic or sclerotic lesion seen
 in the proximal left femur. Vascular calcification left groin region is seen. 

 

IMPRESSION:  There is no acute fracture or dislocation in the pelvis or left hip.

## 2018-09-21 ENCOUNTER — HOSPITAL ENCOUNTER (EMERGENCY)
Dept: HOSPITAL 47 - EC | Age: 73
Discharge: HOME | End: 2018-09-21
Payer: MEDICARE

## 2018-09-21 VITALS — TEMPERATURE: 98.3 F | DIASTOLIC BLOOD PRESSURE: 87 MMHG | HEART RATE: 74 BPM | SYSTOLIC BLOOD PRESSURE: 143 MMHG

## 2018-09-21 VITALS — RESPIRATION RATE: 20 BRPM

## 2018-09-21 DIAGNOSIS — M19.90: ICD-10-CM

## 2018-09-21 DIAGNOSIS — E78.5: ICD-10-CM

## 2018-09-21 DIAGNOSIS — K21.9: ICD-10-CM

## 2018-09-21 DIAGNOSIS — Z87.891: ICD-10-CM

## 2018-09-21 DIAGNOSIS — Z79.82: ICD-10-CM

## 2018-09-21 DIAGNOSIS — T79.8XXA: ICD-10-CM

## 2018-09-21 DIAGNOSIS — M25.552: Primary | ICD-10-CM

## 2018-09-21 DIAGNOSIS — Z88.5: ICD-10-CM

## 2018-09-21 DIAGNOSIS — M54.5: ICD-10-CM

## 2018-09-21 DIAGNOSIS — T24.212A: ICD-10-CM
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
## 2018-09-21 DIAGNOSIS — R10.32: ICD-10-CM

## 2018-09-21 DIAGNOSIS — Z79.899: ICD-10-CM

## 2018-09-21 DIAGNOSIS — I25.10: ICD-10-CM

## 2018-09-21 DIAGNOSIS — I25.2: ICD-10-CM

## 2018-09-21 DIAGNOSIS — I10: ICD-10-CM

## 2018-09-21 LAB
ALBUMIN SERPL-MCNC: 3.5 G/DL (ref 3.5–5)
ALP SERPL-CCNC: 93 U/L (ref 38–126)
ALT SERPL-CCNC: 37 U/L (ref 21–72)
ANION GAP SERPL CALC-SCNC: 8 MMOL/L
AST SERPL-CCNC: 25 U/L (ref 17–59)
BASOPHILS # BLD AUTO: 0.1 K/UL (ref 0–0.2)
BASOPHILS NFR BLD AUTO: 1 %
BUN SERPL-SCNC: 22 MG/DL (ref 9–20)
CALCIUM SPEC-MCNC: 9.1 MG/DL (ref 8.4–10.2)
CHLORIDE SERPL-SCNC: 103 MMOL/L (ref 98–107)
CO2 SERPL-SCNC: 28 MMOL/L (ref 22–30)
EOSINOPHIL # BLD AUTO: 0.2 K/UL (ref 0–0.7)
EOSINOPHIL NFR BLD AUTO: 3 %
ERYTHROCYTE [DISTWIDTH] IN BLOOD BY AUTOMATED COUNT: 5.07 M/UL (ref 4.3–5.9)
ERYTHROCYTE [DISTWIDTH] IN BLOOD: 13.5 % (ref 11.5–15.5)
GLUCOSE SERPL-MCNC: 91 MG/DL (ref 74–99)
HCT VFR BLD AUTO: 44.1 % (ref 39–53)
HGB BLD-MCNC: 14.6 GM/DL (ref 13–17.5)
LYMPHOCYTES # SPEC AUTO: 1.8 K/UL (ref 1–4.8)
LYMPHOCYTES NFR SPEC AUTO: 24 %
MCH RBC QN AUTO: 28.7 PG (ref 25–35)
MCHC RBC AUTO-ENTMCNC: 33 G/DL (ref 31–37)
MCV RBC AUTO: 87 FL (ref 80–100)
MONOCYTES # BLD AUTO: 0.8 K/UL (ref 0–1)
MONOCYTES NFR BLD AUTO: 11 %
NEUTROPHILS # BLD AUTO: 4.4 K/UL (ref 1.3–7.7)
NEUTROPHILS NFR BLD AUTO: 59 %
PH UR: 6 [PH] (ref 5–8)
PLATELET # BLD AUTO: 243 K/UL (ref 150–450)
POTASSIUM SERPL-SCNC: 4.9 MMOL/L (ref 3.5–5.1)
PROT SERPL-MCNC: 6.4 G/DL (ref 6.3–8.2)
SODIUM SERPL-SCNC: 139 MMOL/L (ref 137–145)
SP GR UR: 1.01 (ref 1–1.03)
UROBILINOGEN UR QL STRIP: <2 MG/DL (ref ?–2)
WBC # BLD AUTO: 7.5 K/UL (ref 3.8–10.6)

## 2018-09-21 PROCEDURE — 74176 CT ABD & PELVIS W/O CONTRAST: CPT

## 2018-09-21 PROCEDURE — 85025 COMPLETE CBC W/AUTO DIFF WBC: CPT

## 2018-09-21 PROCEDURE — 99284 EMERGENCY DEPT VISIT MOD MDM: CPT

## 2018-09-21 PROCEDURE — 36415 COLL VENOUS BLD VENIPUNCTURE: CPT

## 2018-09-21 PROCEDURE — 80053 COMPREHEN METABOLIC PANEL: CPT

## 2018-09-21 PROCEDURE — 81003 URINALYSIS AUTO W/O SCOPE: CPT

## 2018-09-21 NOTE — CT
EXAMINATION TYPE: CT abdomen pelvis wo con

 

DATE OF EXAM: 9/21/2018

 

COMPARISON: Plain films 9/14/2018

 

HISTORY: left hip and back pain.

 

CT DLP: 1078 mGycm

Automated exposure control for dose reduction was used.

 

TECHNIQUE:  Helical acquisition of images from the lung bases through the pelvis.

 

FINDINGS: Lack of intravenous contrast may compromise the sensitivity of the exam. There is a hiatal 
hernia present with the portion of the stomach within the chest. Coronary artery calcifications are p
resent.

 

LUNG BASES: Some strand-like densities at the lung bases are compatible with basilar atelectasis or s
carring.

 

AORTA:  Atheromatous changes are present in the distribution of the aorta, iliac vessels, mesenteric 
vasculature

 

LIVER/GB: No significant abnormality is appreciated.

 

PANCREAS: No significant abnormality is seen.

 

SPLEEN: No significant abnormality is seen.

 

ADRENALS: No significant abnormality is seen.

 

KIDNEYS: No significant abnormality is seen.

 

 

REPRODUCTIVE ORGANS:  No significant abnormality is seen.

 

URINARY BLADDER:  There is a somewhat thickened wall possibly due to chronic outlet obstruction, karen
elate to exclude cystitis.

 

BOWEL:  Diverticular changes associated with the sigmoid colon. The appendix is normal. There is some
 retained fecal debris present throughout much of the colon, correlate for fecal stasis.

 

FREE AIR:  No Free Air is visible.

 

ASCITES:  None visible.

 

PELVIC ADENOPATHY:  None visualized.

 

RETROPERITONEAL ADENOPATHY:  No Retroperitoneal Adenopathy visible.

 

OSSEOUS STRUCTURES:  There is degenerative disc disease especially in the lumbar spine with spondylol
isthesis. Facet arthropathy may cause some spinal stenosis in the lower lumbar spine..

 

IMPRESSION: 

NONCONTRAST EXAM. DEGENERATIVE DISC DISEASE AND FACET ARTHROPATHY. LUMBAR MRI MAY BE OF BENEFIT. DIVE
RTICULOSIS. HIATAL HERNIA. ADDITIONAL FINDINGS ABOVE.

## 2019-10-22 ENCOUNTER — HOSPITAL ENCOUNTER (OUTPATIENT)
Dept: HOSPITAL 47 - RADUSWWP | Age: 74
Discharge: HOME | End: 2019-10-22
Attending: INTERNAL MEDICINE
Payer: MEDICARE

## 2019-10-22 DIAGNOSIS — M51.36: Primary | ICD-10-CM

## 2019-10-22 DIAGNOSIS — M47.896: ICD-10-CM

## 2019-10-22 PROCEDURE — 72100 X-RAY EXAM L-S SPINE 2/3 VWS: CPT

## 2019-10-22 NOTE — XR
Lumbar spine

 

HISTORY: Low back pain radiating down left leg

 

3 views of the lumbar spine correlated to prior exam 9/14/2018

 

Findings are similar to prior exam. There is multilevel spondylosis with slight spinal curvature. Lum
bar vertebral bodies show decreased bone mineralization. Alignment is nearly unchanged, minimal listh
eses at the mid lumbar spine may be due to underlying facet arthropathy. Vertebral body height is jamilah
ntained. Vascular calcifications are noted anteriorly. Multilevel loss of disc height noted.

 

IMPRESSION: Stable degenerative disc disease.

## 2019-10-22 NOTE — US
EXAMINATION TYPE: US venous doppler duplex LE LT

 

DATE OF EXAM: 10/22/2019 12:16 PM

 

COMPARISON: NONE

 

CLINICAL HISTORY: M79.605 PAIN IN LT LEG. x 2 weeks. 

 

SIDE PERFORMED: Left  

 

TECHNIQUE:  The lower extremity deep venous system is examined utilizing real time linear array sonog
olivia with graded compression, doppler sonography and color-flow sonography.

 

VESSELS IMAGED:

External Iliac Vein (EIV)

Common Femoral Vein

Deep Femoral Vein

Greater Saphenous Vein *

Femoral Vein

Popliteal Vein

Small Saphenous Vein *

Proximal Calf Veins

(* superficial vessels)

 

 

Grayscale, color doppler, spectral doppler imaging performed of the deep veins of the left lower extr
emity.  There is normal flow, compressibility, vascular waveforms.

 

Left Leg:  Negative for DVT

 

IMPRESSION:  No sonographic evidence of deep venous thrombosis within the left lower extremity.

## 2022-08-05 ENCOUNTER — HOSPITAL ENCOUNTER (OUTPATIENT)
Dept: HOSPITAL 47 - RADXRMAIN | Age: 77
Discharge: HOME | End: 2022-08-05
Attending: INTERNAL MEDICINE
Payer: MEDICARE

## 2022-08-05 DIAGNOSIS — R07.9: Primary | ICD-10-CM

## 2022-08-05 PROCEDURE — 71046 X-RAY EXAM CHEST 2 VIEWS: CPT

## 2022-08-05 NOTE — XR
EXAMINATION TYPE: XR chest 2V

 

DATE OF EXAM: 8/5/2022 12:46 PM

 

COMPARISON: Chest radiographs from 6/8/2019.

 

TECHNIQUE: XR chest 2V Frontal and lateral views of the chest.

 

CLINICAL INDICATION:Male, 77 years old with history of R07.9 chest pain; 

 

FINDINGS: 

Lungs/Pleura: Mild increased interstitial lung markings within the lung bases posteriorly. Not signif
icantly There is no evidence of pleural effusion, focal consolidation, or pneumothorax.  

Pulmonary vascularity: Unremarkable.

Heart/mediastinum: Cardiomediastinal silhouette is unremarkable.  Atherosclerotic calcifications are 
seen in the aorta.

Musculoskeletal: No acute osseous pathology.

 

IMPRESSION: 

Increased interstitial lung markings in the lung bases may correlate for pneumonia. Not definitively 
visualized on prior.